# Patient Record
Sex: MALE | Race: WHITE | NOT HISPANIC OR LATINO | Employment: OTHER | URBAN - METROPOLITAN AREA
[De-identification: names, ages, dates, MRNs, and addresses within clinical notes are randomized per-mention and may not be internally consistent; named-entity substitution may affect disease eponyms.]

---

## 2017-01-29 ENCOUNTER — APPOINTMENT (OUTPATIENT)
Dept: PREADMISSION TESTING | Facility: HOSPITAL | Age: 77
End: 2017-01-29

## 2017-01-29 DIAGNOSIS — I49.3 PVC (PREMATURE VENTRICULAR CONTRACTION): ICD-10-CM

## 2017-01-29 LAB
ANION GAP SERPL CALCULATED.3IONS-SCNC: 4 MMOL/L (ref 3–11)
BASOPHILS # BLD AUTO: 0.01 10*3/MM3 (ref 0–0.2)
BASOPHILS NFR BLD AUTO: 0.1 % (ref 0–1)
BUN BLD-MCNC: 28 MG/DL (ref 9–23)
BUN/CREAT SERPL: 23.3 (ref 7–25)
CALCIUM SPEC-SCNC: 10.8 MG/DL (ref 8.7–10.4)
CHLORIDE SERPL-SCNC: 110 MMOL/L (ref 99–109)
CO2 SERPL-SCNC: 29 MMOL/L (ref 20–31)
CREAT BLD-MCNC: 1.2 MG/DL (ref 0.6–1.3)
DEPRECATED RDW RBC AUTO: 50.5 FL (ref 37–54)
EOSINOPHIL # BLD AUTO: 0.14 10*3/MM3 (ref 0.1–0.3)
EOSINOPHIL NFR BLD AUTO: 2 % (ref 0–3)
ERYTHROCYTE [DISTWIDTH] IN BLOOD BY AUTOMATED COUNT: 13.9 % (ref 11.3–14.5)
GFR SERPL CREATININE-BSD FRML MDRD: 59 ML/MIN/1.73
GLUCOSE BLD-MCNC: 102 MG/DL (ref 70–100)
HCT VFR BLD AUTO: 44.8 % (ref 38.9–50.9)
HGB BLD-MCNC: 14.6 G/DL (ref 13.1–17.5)
IMM GRANULOCYTES # BLD: 0.01 10*3/MM3 (ref 0–0.03)
IMM GRANULOCYTES NFR BLD: 0.1 % (ref 0–0.6)
INR PPP: 1.02
LYMPHOCYTES # BLD AUTO: 1.98 10*3/MM3 (ref 0.6–4.8)
LYMPHOCYTES NFR BLD AUTO: 28.7 % (ref 24–44)
MCH RBC QN AUTO: 32.3 PG (ref 27–31)
MCHC RBC AUTO-ENTMCNC: 32.6 G/DL (ref 32–36)
MCV RBC AUTO: 99.1 FL (ref 80–99)
MONOCYTES # BLD AUTO: 0.62 10*3/MM3 (ref 0–1)
MONOCYTES NFR BLD AUTO: 9 % (ref 0–12)
NEUTROPHILS # BLD AUTO: 4.13 10*3/MM3 (ref 1.5–8.3)
NEUTROPHILS NFR BLD AUTO: 60.1 % (ref 41–71)
PLATELET # BLD AUTO: 205 10*3/MM3 (ref 150–450)
PMV BLD AUTO: 11.8 FL (ref 6–12)
POTASSIUM BLD-SCNC: 5 MMOL/L (ref 3.5–5.5)
PROTHROMBIN TIME: 11.1 SECONDS (ref 9.6–11.5)
RBC # BLD AUTO: 4.52 10*6/MM3 (ref 4.2–5.76)
SODIUM BLD-SCNC: 143 MMOL/L (ref 132–146)
WBC NRBC COR # BLD: 6.89 10*3/MM3 (ref 3.5–10.8)

## 2017-01-29 PROCEDURE — 80048 BASIC METABOLIC PNL TOTAL CA: CPT | Performed by: PHYSICIAN ASSISTANT

## 2017-01-29 PROCEDURE — 36415 COLL VENOUS BLD VENIPUNCTURE: CPT

## 2017-01-29 PROCEDURE — 85610 PROTHROMBIN TIME: CPT | Performed by: PHYSICIAN ASSISTANT

## 2017-01-29 PROCEDURE — 85025 COMPLETE CBC W/AUTO DIFF WBC: CPT | Performed by: PHYSICIAN ASSISTANT

## 2017-05-15 ENCOUNTER — APPOINTMENT (OUTPATIENT)
Dept: CARDIOLOGY | Facility: HOSPITAL | Age: 77
End: 2017-05-15
Attending: INTERNAL MEDICINE

## 2017-05-25 ENCOUNTER — OFFICE VISIT (OUTPATIENT)
Dept: CARDIOLOGY | Facility: CLINIC | Age: 77
End: 2017-05-25

## 2017-05-25 VITALS — HEART RATE: 60 BPM | SYSTOLIC BLOOD PRESSURE: 120 MMHG | HEIGHT: 71 IN | DIASTOLIC BLOOD PRESSURE: 68 MMHG

## 2017-05-25 DIAGNOSIS — I49.3 PVC'S (PREMATURE VENTRICULAR CONTRACTIONS): Primary | ICD-10-CM

## 2017-05-25 DIAGNOSIS — G47.33 OSA (OBSTRUCTIVE SLEEP APNEA): ICD-10-CM

## 2017-05-25 DIAGNOSIS — I25.5 CARDIOMYOPATHY, ISCHEMIC: ICD-10-CM

## 2017-05-25 DIAGNOSIS — I50.22 CHRONIC SYSTOLIC CONGESTIVE HEART FAILURE (HCC): ICD-10-CM

## 2017-05-25 PROCEDURE — 99212 OFFICE O/P EST SF 10 MIN: CPT | Performed by: INTERNAL MEDICINE

## 2017-05-25 RX ORDER — ASPIRIN 325 MG
650 TABLET, DELAYED RELEASE (ENTERIC COATED) ORAL NIGHTLY
COMMUNITY
End: 2019-06-04 | Stop reason: HOSPADM

## 2019-06-01 ENCOUNTER — HOSPITAL ENCOUNTER (INPATIENT)
Facility: HOSPITAL | Age: 79
LOS: 3 days | Discharge: HOME | End: 2019-06-04
Attending: EMERGENCY MEDICINE | Admitting: INTERNAL MEDICINE

## 2019-06-01 ENCOUNTER — APPOINTMENT (OUTPATIENT)
Dept: GENERAL RADIOLOGY | Facility: HOSPITAL | Age: 79
End: 2019-06-01

## 2019-06-01 ENCOUNTER — APPOINTMENT (OUTPATIENT)
Dept: CT IMAGING | Facility: HOSPITAL | Age: 79
End: 2019-06-01

## 2019-06-01 ENCOUNTER — APPOINTMENT (OUTPATIENT)
Dept: MRI IMAGING | Facility: HOSPITAL | Age: 79
End: 2019-06-01

## 2019-06-01 DIAGNOSIS — R55 SYNCOPE AND COLLAPSE: ICD-10-CM

## 2019-06-01 DIAGNOSIS — K92.1 GASTROINTESTINAL HEMORRHAGE WITH MELENA: Primary | ICD-10-CM

## 2019-06-01 DIAGNOSIS — Z79.1 NSAID LONG-TERM USE: ICD-10-CM

## 2019-06-01 DIAGNOSIS — Z74.09 IMPAIRED FUNCTIONAL MOBILITY, BALANCE, GAIT, AND ENDURANCE: ICD-10-CM

## 2019-06-01 PROBLEM — R53.1 WEAKNESS: Status: ACTIVE | Noted: 2019-06-01
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
## 2019-06-01 PROBLEM — R47.1 DYSARTHRIA: Status: ACTIVE | Noted: 2019-06-01

## 2019-06-01 PROBLEM — E87.20 LACTIC ACIDOSIS: Status: ACTIVE | Noted: 2019-06-01

## 2019-06-01 PROBLEM — I63.9 ACUTE CEREBROVASCULAR ACCIDENT (CVA) OF CEREBELLUM (HCC): Status: ACTIVE | Noted: 2019-06-01

## 2019-06-01 PROBLEM — K57.90 DIVERTICULAR DISEASE: Status: ACTIVE | Noted: 2019-06-01

## 2019-06-01 LAB
ABO GROUP BLD: NORMAL
ABO GROUP BLD: NORMAL
ALBUMIN SERPL-MCNC: 4 G/DL (ref 3.5–5.2)
ALBUMIN/GLOB SERPL: 1.3 G/DL
ALP SERPL-CCNC: 52 U/L (ref 39–117)
ALT SERPL W P-5'-P-CCNC: 12 U/L (ref 1–41)
ANION GAP SERPL CALCULATED.3IONS-SCNC: 13 MMOL/L
AST SERPL-CCNC: 13 U/L (ref 1–40)
BACTERIA UR QL AUTO: NORMAL /HPF
BASOPHILS # BLD AUTO: 0.01 10*3/MM3 (ref 0–0.2)
BASOPHILS NFR BLD AUTO: 0.1 % (ref 0–1.5)
BILIRUB SERPL-MCNC: 0.5 MG/DL (ref 0.2–1.2)
BILIRUB UR QL STRIP: NEGATIVE
BLD GP AB SCN SERPL QL: NEGATIVE
BUN BLD-MCNC: 51 MG/DL (ref 8–23)
BUN/CREAT SERPL: 46.8 (ref 7–25)
CALCIUM SPEC-SCNC: 9.8 MG/DL (ref 8.6–10.5)
CHLORIDE SERPL-SCNC: 108 MMOL/L (ref 98–107)
CLARITY UR: CLEAR
CO2 SERPL-SCNC: 20 MMOL/L (ref 22–29)
COLOR UR: YELLOW
CREAT BLD-MCNC: 1.09 MG/DL (ref 0.76–1.27)
D-LACTATE SERPL-SCNC: 1.8 MMOL/L (ref 0.5–2)
D-LACTATE SERPL-SCNC: 2.5 MMOL/L (ref 0.5–2)
DEPRECATED RDW RBC AUTO: 49.6 FL (ref 37–54)
DEVELOPER EXPIRATION DATE: ABNORMAL
DEVELOPER LOT NUMBER: 7894
EOSINOPHIL # BLD AUTO: 0 10*3/MM3 (ref 0–0.4)
EOSINOPHIL NFR BLD AUTO: 0 % (ref 0.3–6.2)
ERYTHROCYTE [DISTWIDTH] IN BLOOD BY AUTOMATED COUNT: 13.9 % (ref 12.3–15.4)
EXPIRATION DATE: ABNORMAL
FECAL OCCULT BLOOD SCREEN, POC: POSITIVE
GFR SERPL CREATININE-BSD FRML MDRD: 65 ML/MIN/1.73
GLOBULIN UR ELPH-MCNC: 3 GM/DL
GLUCOSE BLD-MCNC: 136 MG/DL (ref 65–99)
GLUCOSE BLDC GLUCOMTR-MCNC: 111 MG/DL (ref 70–130)
GLUCOSE BLDC GLUCOMTR-MCNC: 116 MG/DL (ref 70–130)
GLUCOSE UR STRIP-MCNC: NEGATIVE MG/DL
HCT VFR BLD AUTO: 26.7 % (ref 37.5–51)
HCT VFR BLD AUTO: 30.2 % (ref 37.5–51)
HGB BLD-MCNC: 8.6 G/DL (ref 13–17.7)
HGB BLD-MCNC: 9.8 G/DL (ref 13–17.7)
HGB UR QL STRIP.AUTO: NEGATIVE
HOLD SPECIMEN: NORMAL
HYALINE CASTS UR QL AUTO: NORMAL /LPF
IMM GRANULOCYTES # BLD AUTO: 0.04 10*3/MM3 (ref 0–0.05)
IMM GRANULOCYTES NFR BLD AUTO: 0.4 % (ref 0–0.5)
KETONES UR QL STRIP: NEGATIVE
LEUKOCYTE ESTERASE UR QL STRIP.AUTO: ABNORMAL
LYMPHOCYTES # BLD AUTO: 1.02 10*3/MM3 (ref 0.7–3.1)
LYMPHOCYTES NFR BLD AUTO: 10.3 % (ref 19.6–45.3)
Lab: ABNORMAL
MAGNESIUM SERPL-MCNC: 2.5 MG/DL (ref 1.6–2.4)
MCH RBC QN AUTO: 31.9 PG (ref 26.6–33)
MCHC RBC AUTO-ENTMCNC: 32.5 G/DL (ref 31.5–35.7)
MCV RBC AUTO: 98.4 FL (ref 79–97)
MONOCYTES # BLD AUTO: 0.57 10*3/MM3 (ref 0.1–0.9)
MONOCYTES NFR BLD AUTO: 5.8 % (ref 5–12)
NEGATIVE CONTROL: NEGATIVE
NEUTROPHILS # BLD AUTO: 8.29 10*3/MM3 (ref 1.7–7)
NEUTROPHILS NFR BLD AUTO: 83.8 % (ref 42.7–76)
NITRITE UR QL STRIP: NEGATIVE
PH UR STRIP.AUTO: <=5 [PH] (ref 5–8)
PLATELET # BLD AUTO: 236 10*3/MM3 (ref 140–450)
PMV BLD AUTO: 10.7 FL (ref 6–12)
POSITIVE CONTROL: POSITIVE
POTASSIUM BLD-SCNC: 4.3 MMOL/L (ref 3.5–5.2)
PROT SERPL-MCNC: 7 G/DL (ref 6–8.5)
PROT UR QL STRIP: NEGATIVE
RBC # BLD AUTO: 3.07 10*6/MM3 (ref 4.14–5.8)
RBC # UR: NORMAL /HPF
REF LAB TEST METHOD: NORMAL
RH BLD: POSITIVE
RH BLD: POSITIVE
SODIUM BLD-SCNC: 141 MMOL/L (ref 136–145)
SP GR UR STRIP: 1.02 (ref 1–1.03)
SQUAMOUS #/AREA URNS HPF: NORMAL /HPF
T&S EXPIRATION DATE: NORMAL
TROPONIN T SERPL-MCNC: <0.01 NG/ML (ref 0–0.03)
UROBILINOGEN UR QL STRIP: ABNORMAL
WBC NRBC COR # BLD: 9.89 10*3/MM3 (ref 3.4–10.8)
WBC UR QL AUTO: NORMAL /HPF
WHOLE BLOOD HOLD SPECIMEN: NORMAL
WHOLE BLOOD HOLD SPECIMEN: NORMAL

## 2019-06-01 PROCEDURE — 80053 COMPREHEN METABOLIC PANEL: CPT | Performed by: EMERGENCY MEDICINE

## 2019-06-01 PROCEDURE — 86923 COMPATIBILITY TEST ELECTRIC: CPT

## 2019-06-01 PROCEDURE — 85025 COMPLETE CBC W/AUTO DIFF WBC: CPT | Performed by: EMERGENCY MEDICINE

## 2019-06-01 PROCEDURE — 86900 BLOOD TYPING SEROLOGIC ABO: CPT

## 2019-06-01 PROCEDURE — 93005 ELECTROCARDIOGRAM TRACING: CPT | Performed by: EMERGENCY MEDICINE

## 2019-06-01 PROCEDURE — 83735 ASSAY OF MAGNESIUM: CPT | Performed by: EMERGENCY MEDICINE

## 2019-06-01 PROCEDURE — 99221 1ST HOSP IP/OBS SF/LOW 40: CPT | Performed by: HOSPITALIST

## 2019-06-01 PROCEDURE — 85014 HEMATOCRIT: CPT | Performed by: HOSPITALIST

## 2019-06-01 PROCEDURE — 85018 HEMOGLOBIN: CPT | Performed by: HOSPITALIST

## 2019-06-01 PROCEDURE — 74176 CT ABD & PELVIS W/O CONTRAST: CPT

## 2019-06-01 PROCEDURE — 36430 TRANSFUSION BLD/BLD COMPNT: CPT

## 2019-06-01 PROCEDURE — 99285 EMERGENCY DEPT VISIT HI MDM: CPT

## 2019-06-01 PROCEDURE — 83605 ASSAY OF LACTIC ACID: CPT | Performed by: EMERGENCY MEDICINE

## 2019-06-01 PROCEDURE — 86900 BLOOD TYPING SEROLOGIC ABO: CPT | Performed by: EMERGENCY MEDICINE

## 2019-06-01 PROCEDURE — 84484 ASSAY OF TROPONIN QUANT: CPT | Performed by: EMERGENCY MEDICINE

## 2019-06-01 PROCEDURE — 86901 BLOOD TYPING SEROLOGIC RH(D): CPT | Performed by: EMERGENCY MEDICINE

## 2019-06-01 PROCEDURE — 70551 MRI BRAIN STEM W/O DYE: CPT

## 2019-06-01 PROCEDURE — 99222 1ST HOSP IP/OBS MODERATE 55: CPT | Performed by: INTERNAL MEDICINE

## 2019-06-01 PROCEDURE — 81001 URINALYSIS AUTO W/SCOPE: CPT | Performed by: EMERGENCY MEDICINE

## 2019-06-01 PROCEDURE — 82270 OCCULT BLOOD FECES: CPT | Performed by: EMERGENCY MEDICINE

## 2019-06-01 PROCEDURE — P9016 RBC LEUKOCYTES REDUCED: HCPCS

## 2019-06-01 PROCEDURE — 86901 BLOOD TYPING SEROLOGIC RH(D): CPT

## 2019-06-01 PROCEDURE — 70450 CT HEAD/BRAIN W/O DYE: CPT

## 2019-06-01 PROCEDURE — 86850 RBC ANTIBODY SCREEN: CPT | Performed by: EMERGENCY MEDICINE

## 2019-06-01 PROCEDURE — 82962 GLUCOSE BLOOD TEST: CPT

## 2019-06-01 PROCEDURE — 71045 X-RAY EXAM CHEST 1 VIEW: CPT

## 2019-06-01 RX ORDER — SODIUM CHLORIDE 0.9 % (FLUSH) 0.9 %
3 SYRINGE (ML) INJECTION EVERY 12 HOURS SCHEDULED
Status: DISCONTINUED | OUTPATIENT
Start: 2019-06-01 | End: 2019-06-04 | Stop reason: HOSPADM

## 2019-06-01 RX ORDER — ASPIRIN 81 MG/1
81 TABLET ORAL DAILY
Status: DISCONTINUED | OUTPATIENT
Start: 2019-06-01 | End: 2019-06-01

## 2019-06-01 RX ORDER — SODIUM CHLORIDE 0.9 % (FLUSH) 0.9 %
10 SYRINGE (ML) INJECTION AS NEEDED
Status: DISCONTINUED | OUTPATIENT
Start: 2019-06-01 | End: 2019-06-04 | Stop reason: HOSPADM

## 2019-06-01 RX ORDER — ATORVASTATIN CALCIUM 40 MG/1
40 TABLET, FILM COATED ORAL NIGHTLY
Status: DISCONTINUED | OUTPATIENT
Start: 2019-06-01 | End: 2019-06-04 | Stop reason: HOSPADM

## 2019-06-01 RX ORDER — ANTIOX #8/OM3/DHA/EPA/LUT/ZEAX 250-2.5 MG
1 CAPSULE ORAL 2 TIMES DAILY
COMMUNITY

## 2019-06-01 RX ORDER — PANTOPRAZOLE SODIUM 40 MG/10ML
40 INJECTION, POWDER, LYOPHILIZED, FOR SOLUTION INTRAVENOUS
Status: DISCONTINUED | OUTPATIENT
Start: 2019-06-01 | End: 2019-06-04 | Stop reason: HOSPADM

## 2019-06-01 RX ORDER — SPIRONOLACTONE 25 MG/1
25 TABLET ORAL EVERY MORNING
Status: DISCONTINUED | OUTPATIENT
Start: 2019-06-02 | End: 2019-06-04 | Stop reason: HOSPADM

## 2019-06-01 RX ORDER — HYDROCODONE BITARTRATE AND ACETAMINOPHEN 7.5; 325 MG/1; MG/1
1 TABLET ORAL EVERY 6 HOURS PRN
Status: DISCONTINUED | OUTPATIENT
Start: 2019-06-01 | End: 2019-06-04 | Stop reason: HOSPADM

## 2019-06-01 RX ORDER — PANTOPRAZOLE SODIUM 40 MG/1
40 TABLET, DELAYED RELEASE ORAL
Status: DISCONTINUED | OUTPATIENT
Start: 2019-06-01 | End: 2019-06-01

## 2019-06-01 RX ORDER — METOPROLOL SUCCINATE 50 MG/1
50 TABLET, EXTENDED RELEASE ORAL EVERY MORNING
Status: DISCONTINUED | OUTPATIENT
Start: 2019-06-02 | End: 2019-06-04 | Stop reason: HOSPADM

## 2019-06-01 RX ORDER — AMLODIPINE BESYLATE 5 MG/1
5 TABLET ORAL EVERY MORNING
Status: DISCONTINUED | OUTPATIENT
Start: 2019-06-02 | End: 2019-06-04 | Stop reason: HOSPADM

## 2019-06-01 RX ORDER — SODIUM CHLORIDE 9 MG/ML
75 INJECTION, SOLUTION INTRAVENOUS CONTINUOUS
Status: DISCONTINUED | OUTPATIENT
Start: 2019-06-01 | End: 2019-06-02

## 2019-06-01 RX ORDER — SODIUM CHLORIDE 0.9 % (FLUSH) 0.9 %
3-10 SYRINGE (ML) INJECTION AS NEEDED
Status: DISCONTINUED | OUTPATIENT
Start: 2019-06-01 | End: 2019-06-04 | Stop reason: HOSPADM

## 2019-06-01 RX ORDER — HYDROCODONE BITARTRATE AND ACETAMINOPHEN 7.5; 325 MG/1; MG/1
1 TABLET ORAL EVERY 6 HOURS PRN
COMMUNITY

## 2019-06-01 RX ORDER — SODIUM CHLORIDE 9 MG/ML
125 INJECTION, SOLUTION INTRAVENOUS CONTINUOUS
Status: DISCONTINUED | OUTPATIENT
Start: 2019-06-01 | End: 2019-06-01

## 2019-06-01 RX ORDER — PANTOPRAZOLE SODIUM 40 MG/10ML
40 INJECTION, POWDER, LYOPHILIZED, FOR SOLUTION INTRAVENOUS ONCE
Status: COMPLETED | OUTPATIENT
Start: 2019-06-01 | End: 2019-06-01

## 2019-06-01 RX ADMIN — HYDROCODONE BITARTRATE AND ACETAMINOPHEN 1 TABLET: 7.5; 325 TABLET ORAL at 20:30

## 2019-06-01 RX ADMIN — ATORVASTATIN CALCIUM 40 MG: 40 TABLET, FILM COATED ORAL at 20:28

## 2019-06-01 RX ADMIN — PANTOPRAZOLE SODIUM 40 MG: 40 INJECTION, POWDER, FOR SOLUTION INTRAVENOUS at 20:28

## 2019-06-01 RX ADMIN — SODIUM CHLORIDE 75 ML/HR: 9 INJECTION, SOLUTION INTRAVENOUS at 20:31

## 2019-06-01 RX ADMIN — HYDROCODONE BITARTRATE AND ACETAMINOPHEN 1 TABLET: 7.5; 325 TABLET ORAL at 14:35

## 2019-06-01 RX ADMIN — SODIUM CHLORIDE 125 ML/HR: 9 INJECTION, SOLUTION INTRAVENOUS at 13:40

## 2019-06-01 RX ADMIN — PANTOPRAZOLE SODIUM 40 MG: 40 INJECTION, POWDER, FOR SOLUTION INTRAVENOUS at 11:38

## 2019-06-01 RX ADMIN — SODIUM CHLORIDE, PRESERVATIVE FREE 3 ML: 5 INJECTION INTRAVENOUS at 20:31

## 2019-06-01 NOTE — PLAN OF CARE
Problem: Fall Risk (Adult)  Intervention: Monitor/Assist with Self Care   19 152   Activity   Activity Assistance Provided assistance, 1 person   Daily Care Interventions   Self-Care Promotion BADL personal objects within reach     Intervention: Reduce Risk/Promote Restraint Free Environment   19   Safety Management   Environmental Safety Modification clutter free environment maintained   Safety Promotion/Fall Prevention nonskid shoes/slippers when out of bed   Prevent  Drop/Fall   Safety/Security Measures family to remain at bedside     Intervention: Review Medications/Identify Contributors to Fall Risk   19   Safety Management   Medication Review/Management medications reviewed       Goal: Identify Related Risk Factors and Signs and Symptoms  Outcome: Ongoing (interventions implemented as appropriate)   19 152   Fall Risk (Adult)   Related Risk Factors (Fall Risk) age-related changes;fatigue/slow reaction;fear of falling;gait/mobility problems;history of falls   Signs and Symptoms (Fall Risk) presence of risk factors     Goal: Absence of Fall  Outcome: Ongoing (interventions implemented as appropriate)   19 152   Fall Risk (Adult)   Absence of Fall making progress toward outcome       Problem: Patient Care Overview  Goal: Plan of Care Review  Outcome: Ongoing (interventions implemented as appropriate)   19 152   Coping/Psychosocial   Plan of Care Reviewed With patient;spouse   OTHER   Outcome Summary No falls and patient will verbalize importance of using call bell for assistance.

## 2019-06-01 NOTE — CONSULTS
[facility] [place] [facility]enterology Consult    Referring Provider: No ref. provider found    PCP: Kumar Rodriguez MD    Reason for Consultation: Symptomatic anemia    Chief complaint: Melena    History of present illness:    Salbador Apple is a 78 y.o. male who is admitted with a several day history of melena.  There is associated epigastric discomfort and orthostatic lightheadedness.  Denies nausea or vomiting.  Denies dyspnea or angina.  Symptoms not improved with aspirin.  The patient takes excessive aspirin.    Allergies:  Patient has no known allergies.    Scheduled Meds:    [START ON 6/2/2019] amLODIPine 5 mg Oral QAM   atorvastatin 40 mg Oral Nightly   [START ON 6/2/2019] metoprolol succinate XL 50 mg Oral QAM   pantoprazole 40 mg Oral BID AC   [START ON 6/2/2019] sacubitril-valsartan 1 tablet Oral QAM   sodium chloride 3 mL Intravenous Q12H   [START ON 6/2/2019] spironolactone 25 mg Oral QAM        Infusions:    sodium chloride 125 mL/hr Last Rate: 125 mL/hr (06/01/19 1340)       PRN Meds:  HYDROcodone-acetaminophen  •  sodium chloride  •  sodium chloride    Home Meds:  Medications Prior to Admission   Medication Sig Dispense Refill Last Dose   • amLODIPine (NORVASC) 5 MG tablet Take 5 mg by mouth Every Morning.   5/31/2019 at Unknown time   • aspirin  MG tablet Take 650 mg by mouth Every Night.   5/31/2019 at Unknown time   • HYDROcodone-acetaminophen (NORCO) 7.5-325 MG per tablet Take 1 tablet by mouth Every 6 (Six) Hours As Needed for Moderate Pain .   5/31/2019 at Unknown time   • metoprolol succinate XL (TOPROL-XL) 50 MG 24 hr tablet Take 50 mg by mouth Every Morning.   6/1/2019 at Unknown time   • multivitamins-minerals (PRESERVISION AREDS 2) capsule capsule Take 1 capsule by mouth 2 (Two) Times a Day.   5/31/2019 at Unknown time   • sacubitril-valsartan (ENTRESTO) 24-26 MG tablet Take 1 tablet by mouth 2 (Two) Times a Day.   5/31/2019 at Unknown time   • spironolactone (ALDACTONE) 25 MG tablet  Take 25 mg by mouth Every Morning.   6/1/2019 at Unknown time       ROS: Review of Systems   Constitutional: Positive for activity change and fatigue. Negative for appetite change, chills, fever and unexpected weight change.   HENT: Negative for mouth sores, nosebleeds and voice change.         Hard of hearing.   Eyes: Negative.    Respiratory: Negative.  Negative for cough, chest tightness, shortness of breath, wheezing and stridor.    Cardiovascular: Negative.  Negative for chest pain, palpitations and leg swelling.   Gastrointestinal: Positive for abdominal pain and blood in stool. Negative for abdominal distention, constipation, diarrhea, nausea and vomiting.   Endocrine: Negative.    Genitourinary: Negative.  Negative for difficulty urinating and dysuria.   Musculoskeletal: Positive for arthralgias and back pain.   Skin: Negative.  Negative for color change, pallor and rash.   Allergic/Immunologic: Negative.    Neurological: Positive for dizziness, speech difficulty, weakness and light-headedness. Negative for tremors, seizures, syncope and headaches.   Hematological: Negative.  Negative for adenopathy. Does not bruise/bleed easily.   Psychiatric/Behavioral: Negative.  Negative for agitation and behavioral problems.       PAST MED HX:  Past Medical History:   Diagnosis Date   • Arthritis    • Back pain    • CHF (congestive heart failure) (CMS/HCC)    • Hearing difficulty of both ears    • Hypertension    • Neck pain    • PVC (premature ventricular contraction)        PAST SURG HX:  Past Surgical History:   Procedure Laterality Date   • CARDIAC ABLATION  01/30/2017    PVC ABLATION PER DR. MCINTOSH   • CARDIAC CATHETERIZATION  09/15/2016    PER DR. WAKEFIELD   • CARDIAC CATHETERIZATION N/A 9/15/2016    Procedure: Left Heart Cath;  Surgeon: Lonnie Wakefield MD;  Location: [facility] INVASIVE LOCATION;  Service:    • CARDIAC ELECTROPHYSIOLOGY PROCEDURE N/A 1/30/2017    Procedure: EPS+/- ablation for PVC's;   "Surgeon: Pj Peterson MD;  Location: [facility] INVASIVE LOCATION;  Service:    • PROSTATE SURGERY         FAM HX:  History reviewed. No pertinent family history.    SOC HX:  Social History     Socioeconomic History   • Marital status:      Spouse name: Not on file   • Number of children: Not on file   • Years of education: Not on file   • Highest education level: Not on file   Tobacco Use   • Smoking status: Never Smoker   • Smokeless tobacco: Never Used   Substance and Sexual Activity   • Alcohol use: No   • Drug use: No   • Sexual activity: Defer       PHYSICAL EXAM  /70 (BP Location: Right arm, Patient Position: Sitting)   Pulse 70   Temp 98.2 °F (36.8 °C) (Oral)   Resp 18   Ht 182.9 cm (72\")   Wt 97 kg (213 lb 12.8 oz)   SpO2 97%   BMI 29.00 kg/m²   Wt Readings from Last 3 Encounters:   06/01/19 97 kg (213 lb 12.8 oz)   01/30/17 96 kg (211 lb 10.3 oz)   09/15/16 94.2 kg (207 lb 10.8 oz)   ,body mass index is 29 kg/m².  Physical Exam   Constitutional: He is oriented to person, place, and time. He appears well-developed and well-nourished. No distress.   HENT:   Head: Normocephalic.   Mouth/Throat: No oropharyngeal exudate.   Eyes: Conjunctivae are normal. No scleral icterus.   Neck: Normal range of motion.   Cardiovascular: Normal rate.   Pulmonary/Chest: Effort normal and breath sounds normal. No stridor. No respiratory distress. He has no wheezes. He has no rales.   Abdominal: Soft. Bowel sounds are normal. He exhibits no distension and no mass. There is no tenderness. There is no guarding.   Genitourinary:   Genitourinary Comments: Deferred   Musculoskeletal: Normal range of motion. He exhibits no edema.   Neurological: He is alert and oriented to person, place, and time.   Skin: Skin is warm and dry. Capillary refill takes less than 2 seconds. No rash noted. No erythema. No pallor.   Psychiatric: He has a normal mood and affect. His behavior is normal.   Nursing note and vitals " reviewed.    Results Review:   I reviewed the patient's new clinical results.    Lab Results   Component Value Date    WBC 9.89 06/01/2019    HGB 9.8 (L) 06/01/2019    HGB 14.6 01/29/2017    HGB 15.6 09/15/2016    HCT 30.2 (L) 06/01/2019    MCV 98.4 (H) 06/01/2019     06/01/2019       Lab Results   Component Value Date    INR 1.02 01/29/2017       Lab Results   Component Value Date    GLUCOSE 136 (H) 06/01/2019    BUN 51 (H) 06/01/2019    CREATININE 1.09 06/01/2019    EGFRIFNONA 65 06/01/2019    BCR 46.8 (H) 06/01/2019    CO2 20.0 (L) 06/01/2019    CALCIUM 9.8 06/01/2019    ALBUMIN 4.00 06/01/2019    ALKPHOS 52 06/01/2019    BILITOT 0.5 06/01/2019    ALT 12 06/01/2019    AST 13 06/01/2019       ASSESSMENTS/PLANS    Epigastric pain.  At risk for peptic ulcer disease.  Acute blood loss anemia.  Melena, improving.  Only one bowel movement since admission.  Chronic NSAID use (aspirin).  Lactic acidosis.    >> Agree with twice daily PPI.  Will change to IV.  >> Recommend echocardiogram given history of ischemic cardiomyopathy, and orthostatic symptoms out of proportion to degree of anemia.  >> Plan EGD on Monday, 6/3/2019, sooner if develops overt bleeding.     I discussed the patients findings and my recommendations with patient, family, and Dr. Denis.    Mark I. Brunner, MD  06/01/19  5:20 PM

## 2019-06-01 NOTE — ED PROVIDER NOTES
Subjective   Salbador Apple is a 78 y.o.male who presents to the emergency department with complaints of generalized weakness, dizziness, abdominal pain, nausea, and melenic stools. His symptoms started three days ago and have not been getting any better. He fell and hit his head last night which prompted arrival here for evaluation. He denies head, neck, or back pain from the fall. He did not experience loss of consciousness. He is not on an anticoagulant, but does take Anacin daily. His last colonoscopy was ten years ago. He does not feel that his dyspnea is any worse than baseline. There are no other acute complaints at this time.        History provided by:  Patient and spouse  GI Bleeding   Quality:  Melena  Severity:  Moderate  Onset quality:  Sudden  Duration:  3 days  Timing:  Constant  Progression:  Unchanged  Chronicity:  New  Relieved by:  None tried  Worsened by:  Nothing  Ineffective treatments:  None tried  Associated symptoms: abdominal pain and nausea    Associated symptoms: no headaches and no shortness of breath        Review of Systems   Respiratory: Negative for shortness of breath.    Gastrointestinal: Positive for abdominal pain, blood in stool and nausea.   Musculoskeletal: Negative for back pain and neck pain.   Neurological: Positive for dizziness and weakness. Negative for syncope and headaches.   All other systems reviewed and are negative.      Past Medical History:   Diagnosis Date   • Arthritis    • Back pain    • CHF (congestive heart failure) (CMS/HCC)    • Hearing difficulty of both ears    • Hypertension    • Neck pain    • PVC (premature ventricular contraction)        No Known Allergies    Past Surgical History:   Procedure Laterality Date   • CARDIAC ABLATION  01/30/2017    PVC ABLATION PER DR. MCINTOSH   • CARDIAC CATHETERIZATION  09/15/2016    PER DR. WAKEFIELD   • CARDIAC CATHETERIZATION N/A 9/15/2016    Procedure: Left Heart Cath;  Surgeon: Lonnie Wakefield MD;   Location: [facility] CATH INVASIVE LOCATION;  Service:    • CARDIAC ELECTROPHYSIOLOGY PROCEDURE N/A 1/30/2017    Procedure: EPS+/- ablation for PVC's;  Surgeon: Pj Peterson MD;  Location: [facility] EP INVASIVE LOCATION;  Service:    • PROSTATE SURGERY         History reviewed. No pertinent family history.    Social History     Socioeconomic History   • Marital status:      Spouse name: Not on file   • Number of children: Not on file   • Years of education: Not on file   • Highest education level: Not on file   Tobacco Use   • Smoking status: Never Smoker   • Smokeless tobacco: Never Used   Substance and Sexual Activity   • Alcohol use: No   • Drug use: No   • Sexual activity: Defer         Objective   Physical Exam   Constitutional: He is oriented to person, place, and time. He appears well-developed and well-nourished. No distress.   HENT:   Head: Normocephalic. Head is with abrasion.   Mouth/Throat: Oropharynx is clear and moist.   Small abrasion on the occiput. No crepitus or tenderness.   Eyes: No scleral icterus.   Conjunctivae are pale.   Neck: Normal range of motion and full passive range of motion without pain. Neck supple. No spinous process tenderness present.   No tenderness over the cervical spine. No step-off or crepitus.   Cardiovascular: Normal rate, regular rhythm and normal heart sounds.   No murmur heard.  Pulmonary/Chest: Effort normal and breath sounds normal. No respiratory distress.   Abdominal: Soft. Bowel sounds are normal. There is tenderness in the left lower quadrant. There is no rigidity, no rebound and no guarding.   Mild tenderness to the left lower quadrant with no guarding, rebound, or rigidity.   Genitourinary: Rectal exam shows guaiac positive stool. Rectal exam shows no mass and anal tone normal.   Genitourinary Comments: Good sphincter tone. No mass. Stool is black and heme positive.   Musculoskeletal: He exhibits no edema.   Neurological: He is alert and oriented to  person, place, and time.   Skin: Skin is warm and dry. No rash noted. No erythema. There is pallor.   Psychiatric: He has a normal mood and affect. His behavior is normal.   Nursing note and vitals reviewed.      Procedures         ED Course  ED Course as of Jun 01 2127   Sat Jun 01, 2019   1330 Discussed the findings with the patient and his wife.  We discussed the need for admission due to syncopal episode and the copious amounts of black tarry stools.  I suspect the aspirin is the culprit causing some type of gastric ulcer.  He has been given a first dose of Protonix Dr. THOMSON has agreed to admit the patient to telemetry.  [JOHANN]      ED Course User Index  [JOHANN] Ayad Reis PA     Recent Results (from the past 24 hour(s))   Comprehensive Metabolic Panel    Collection Time: 06/01/19 11:32 AM   Result Value Ref Range    Glucose 136 (H) 65 - 99 mg/dL    BUN 51 (H) 8 - 23 mg/dL    Creatinine 1.09 0.76 - 1.27 mg/dL    Sodium 141 136 - 145 mmol/L    Potassium 4.3 3.5 - 5.2 mmol/L    Chloride 108 (H) 98 - 107 mmol/L    CO2 20.0 (L) 22.0 - 29.0 mmol/L    Calcium 9.8 8.6 - 10.5 mg/dL    Total Protein 7.0 6.0 - 8.5 g/dL    Albumin 4.00 3.50 - 5.20 g/dL    ALT (SGPT) 12 1 - 41 U/L    AST (SGOT) 13 1 - 40 U/L    Alkaline Phosphatase 52 39 - 117 U/L    Total Bilirubin 0.5 0.2 - 1.2 mg/dL    eGFR Non African Amer 65 >60 mL/min/1.73    Globulin 3.0 gm/dL    A/G Ratio 1.3 g/dL    BUN/Creatinine Ratio 46.8 (H) 7.0 - 25.0    Anion Gap 13.0 mmol/L   Troponin    Collection Time: 06/01/19 11:32 AM   Result Value Ref Range    Troponin T <0.010 0.000 - 0.030 ng/mL   Magnesium    Collection Time: 06/01/19 11:32 AM   Result Value Ref Range    Magnesium 2.5 (H) 1.6 - 2.4 mg/dL   Light Blue Top    Collection Time: 06/01/19 11:32 AM   Result Value Ref Range    Extra Tube hold for add-on    Green Top (Gel)    Collection Time: 06/01/19 11:32 AM   Result Value Ref Range    Extra Tube Hold for add-ons.    Lavender Top    Collection Time:  06/01/19 11:32 AM   Result Value Ref Range    Extra Tube hold for add-on    Gold Top - SST    Collection Time: 06/01/19 11:32 AM   Result Value Ref Range    Extra Tube Hold for add-ons.    CBC Auto Differential    Collection Time: 06/01/19 11:32 AM   Result Value Ref Range    WBC 9.89 3.40 - 10.80 10*3/mm3    RBC 3.07 (L) 4.14 - 5.80 10*6/mm3    Hemoglobin 9.8 (L) 13.0 - 17.7 g/dL    Hematocrit 30.2 (L) 37.5 - 51.0 %    MCV 98.4 (H) 79.0 - 97.0 fL    MCH 31.9 26.6 - 33.0 pg    MCHC 32.5 31.5 - 35.7 g/dL    RDW 13.9 12.3 - 15.4 %    RDW-SD 49.6 37.0 - 54.0 fl    MPV 10.7 6.0 - 12.0 fL    Platelets 236 140 - 450 10*3/mm3    Neutrophil % 83.8 (H) 42.7 - 76.0 %    Lymphocyte % 10.3 (L) 19.6 - 45.3 %    Monocyte % 5.8 5.0 - 12.0 %    Eosinophil % 0.0 (L) 0.3 - 6.2 %    Basophil % 0.1 0.0 - 1.5 %    Immature Grans % 0.4 0.0 - 0.5 %    Neutrophils, Absolute 8.29 (H) 1.70 - 7.00 10*3/mm3    Lymphocytes, Absolute 1.02 0.70 - 3.10 10*3/mm3    Monocytes, Absolute 0.57 0.10 - 0.90 10*3/mm3    Eosinophils, Absolute 0.00 0.00 - 0.40 10*3/mm3    Basophils, Absolute 0.01 0.00 - 0.20 10*3/mm3    Immature Grans, Absolute 0.04 0.00 - 0.05 10*3/mm3   Type & Screen    Collection Time: 06/01/19 11:32 AM   Result Value Ref Range    ABO Type A     RH type Positive     Antibody Screen Negative     T&S Expiration Date 6/4/2019 11:59:59 PM    Lactic Acid, Plasma    Collection Time: 06/01/19 11:32 AM   Result Value Ref Range    Lactate 2.5 (C) 0.5 - 2.0 mmol/L   Lactic Acid, Reflex Timer (This will reflex a repeat order 3-3:15 hours after ordered.)    Collection Time: 06/01/19 11:32 AM   Result Value Ref Range    Extra Tube Hold for add-ons.    POCT Occult Blood, stool    Collection Time: 06/01/19 11:34 AM   Result Value Ref Range    Fecal Occult Blood Positive (A) Negative    Lot Number [id number]     Expiration Date 10/2,022     DEVELOPER LOT NUMBER [id number]     DEVELOPER EXPIRATION DATE 10/2,022     Positive Control Positive Positive     Negative Control Negative Negative   ABO RH Specimen Verification    Collection Time: 06/01/19  1:33 PM   Result Value Ref Range    ABO Type A     RH type Positive    Urinalysis With Microscopic If Indicated (No Culture) - Urine, Clean Catch    Collection Time: 06/01/19  1:40 PM   Result Value Ref Range    Color, UA Yellow Yellow, Straw    Appearance, UA Clear Clear    pH, UA <=5.0 5.0 - 8.0    Specific Gravity, UA 1.024 1.001 - 1.030    Glucose, UA Negative Negative    Ketones, UA Negative Negative    Bilirubin, UA Negative Negative    Blood, UA Negative Negative    Protein, UA Negative Negative    Leuk Esterase, UA Trace (A) Negative    Nitrite, UA Negative Negative    Urobilinogen, UA 0.2 E.U./dL 0.2 - 1.0 E.U./dL   Urinalysis, Microscopic Only - Urine, Clean Catch    Collection Time: 06/01/19  1:40 PM   Result Value Ref Range    RBC, UA 0-2 None Seen, 0-2 /HPF    WBC, UA None Seen None Seen, 0-2 /HPF    Bacteria, UA None Seen None Seen, Trace /HPF    Squamous Epithelial Cells, UA None Seen None Seen, 0-2 /HPF    Hyaline Casts, UA None Seen 0 - 6 /LPF    Methodology Automated Microscopy    Hemoglobin & Hematocrit, Blood    Collection Time: 06/01/19  5:49 PM   Result Value Ref Range    Hemoglobin 8.6 (L) 13.0 - 17.7 g/dL    Hematocrit 26.7 (L) 37.5 - 51.0 %   Lactic Acid, Reflex    Collection Time: 06/01/19  5:49 PM   Result Value Ref Range    Lactate 1.8 0.5 - 2.0 mmol/L   POC Glucose Once    Collection Time: 06/01/19  5:50 PM   Result Value Ref Range    Glucose 116 70 - 130 mg/dL   Prepare RBC, 1 Units    Collection Time: 06/01/19  9:11 PM   Result Value Ref Range    Product Code A6075W57     Unit Number [id number]     UNIT  ABO A     UNIT  RH POS     Dispense Status IS     Blood Type APOS     Blood Expiration Date 609496415035     Blood Type Barcode 6200      Note: In addition to lab results from this visit, the labs listed above may include labs taken at another facility or during a different encounter  "within the last 24 hours. Please correlate lab times with ED admission and discharge times for further clarification of the services performed during this visit.    MRI Brain Without Contrast   Preliminary Result   1. Chronic central white matter changes and small old central right   thalamic lacunar infarct. Small old right cerebellar infarct. No   evidence of acute intracranial disease.               CT Head Without Contrast   Preliminary Result   Small old right thalamic infarct. No evidence of acute   trauma or other acute disease.       DICTATED:   06/01/2019   EDITED/ls :   06/01/2019               CT Abdomen Pelvis Without Contrast   Preliminary Result   1. Moderate left colon diverticulosis but no CT scan evidence to suggest   acute diverticulitis.   2. Very small nonobstructing bilateral renal calculi noted.   3. No evidence of acute intra-abdominal or intrapelvic disease is seen   elsewhere.       DICTATED:   06/01/2019   EDITED/ls :   06/01/2019               XR Chest 1 View   Preliminary Result   Mild cardiomegaly. No evidence of active chest disease.       DICTATED:   06/01/2019   EDITED/ls :   06/01/2019                 Vitals:    06/01/19 1400 06/01/19 1435 06/01/19 1746 06/01/19 2058   BP: 127/70  150/71 138/79   BP Location: Right arm  Left arm    Patient Position: Sitting  Sitting    Pulse: 70  70 69   Resp: 18  18 18   Temp: 98.2 °F (36.8 °C)   98.3 °F (36.8 °C)   TempSrc: Oral  Oral Oral   SpO2:    96%   Weight:  97 kg (213 lb 12.8 oz)     Height:  182.9 cm (72\")       Medications   sodium chloride 0.9 % flush 10 mL (not administered)   amLODIPine (NORVASC) tablet 5 mg (not administered)   HYDROcodone-acetaminophen (NORCO) 7.5-325 MG per tablet 1 tablet (1 tablet Oral Given 6/1/19 2030)   metoprolol succinate XL (TOPROL-XL) 24 hr tablet 50 mg (not administered)   sacubitril-valsartan (ENTRESTO) 24-26 MG tablet 1 tablet (not administered)   spironolactone (ALDACTONE) tablet 25 mg (not " administered)   sodium chloride 0.9 % flush 3 mL (3 mL Intravenous Given 6/1/19 2031)   sodium chloride 0.9 % flush 3-10 mL (not administered)   atorvastatin (LIPITOR) tablet 40 mg (40 mg Oral Given 6/1/19 2028)   pantoprazole (PROTONIX) injection 40 mg (40 mg Intravenous Given 6/1/19 2028)   sodium chloride 0.9 % infusion (75 mL/hr Intravenous New Bag 6/1/19 2031)   pantoprazole (PROTONIX) injection 40 mg (40 mg Intravenous Given 6/1/19 1138)     ECG/EMG Results (last 24 hours)     Procedure Component Value Units Date/Time    ECG 12 Lead [[id number]] Collected:  06/01/19 1118     Updated:  06/01/19 1116        ECG 12 Lead                            MDM  Number of Diagnoses or Management Options  Gastrointestinal hemorrhage with melena: new and requires workup  Syncope and collapse: new and requires workup     Amount and/or Complexity of Data Reviewed  Clinical lab tests: reviewed and ordered  Tests in the radiology section of CPT®: reviewed and ordered  Tests in the medicine section of CPT®: reviewed and ordered  Discuss the patient with other providers: yes    Patient Progress  Patient progress: stable      Final diagnoses:   Gastrointestinal hemorrhage with melena   Syncope and collapse   NSAID long-term use       Documentation assistance provided by catalina oMlina.  Information recorded by the catalina was done at my direction and has been verified and validated by me.     Kristina Molina  06/01/19 1139       Ayad Reis PA  06/01/19 1038

## 2019-06-01 NOTE — H&P
"    [facility]  HISTORY AND PHYSICAL    Patient Name: Salbador Apple  : 1940  MRN: [id number]  Primary Care Physician: Kumar Rodriguez MD  Date of admission: 2019      Subjective   Subjective     Chief Complaint:  Melena/fall    HPI:  Salbador Apple is a 78 y.o. male with past medical history significant for essential hypertension, ischemic cardiomyopathy/systolic congestive heart failure, remote CVA, CAD, bilateral hearing loss, obstructive sleep apnea, and osteoarthritis/DJD-had been taking \"bottles and bottles\" of NSAIDs for years up until recently when he was prescribed Norco.  Currently patient reported that he still takes 2 full strength aspirin nightly.  Today he presented to the ED complaining of dark tarry stool with associated generalized weakness with and eventual fall last night.  He stated that for several days he has been feeling poorly/weak and lightheadedness, but denies any chest pain, shortness of air, or palpitations.  He stated that when he got up to go use the bathroom last night he felt weak certainly and fell.  He did not have loss of consciousness.  He did hit the back of his head.  His wife reported that patient was also dysarthric to the point where his speech was garbled.  He also reported numbness in both legs, but denies any other focal paresthesia or weakness.  His last colonoscopy was a screening colonoscopy about 8 to 10 years ago.  He has not had any prior EGD.  He denies any prior history of GI bleed.    Review of Systems   Otherwise complete ROS reviewed and is negative except as mentioned in the HPI.    Personal History     Past Medical History:   Diagnosis Date   • Arthritis    • Back pain    • CHF (congestive heart failure) (CMS/HCC)    • Hearing difficulty of both ears    • Hypertension    • Neck pain    • PVC (premature ventricular contraction)        Past Surgical History:   Procedure Laterality Date   • " CARDIAC ABLATION  01/30/2017    PVC ABLATION PER DR. MCINTOSH   • CARDIAC CATHETERIZATION  09/15/2016    PER DR. WAKEFIELD   • CARDIAC CATHETERIZATION N/A 9/15/2016    Procedure: Left Heart Cath;  Surgeon: Lonnie Wakefield MD;  Location:  [facility];  Service:    • CARDIAC ELECTROPHYSIOLOGY PROCEDURE N/A 1/30/2017    Procedure: EPS+/- ablation for PVC's;  Surgeon: Pj Mcintosh MD;  Location:  [facility];  Service:    • PROSTATE SURGERY         Family History: family history is not on file. Otherwise pertinent FHx was reviewed and unremarkable.     Social History:  reports that he has never smoked. He has never used smokeless tobacco. He reports that he does not drink alcohol or use drugs.  Social History     Social History Narrative   • Not on file       Medications:    Available home medication information reviewed.    No Known Allergies    Objective   Objective     Vital Signs:   Temp:  [98.2 °F (36.8 °C)-99.1 °F (37.3 °C)] 98.2 °F (36.8 °C)  Heart Rate:  [66-86] 70  Resp:  [18] 18  BP: (113-154)/(64-81) 127/70        Physical Exam   General Assessment: No acute cardiopulmonary distress.     HEENT: NCAT, PERRL, MM slightly dry    Neck: Supple, no carotid bruit bilaterally    CVS: RRR, S1S2 normal, no murmurs    Resp: CTAB, no adventitious sound    Abd: soft, NT, ND, normal BS, no guarding or peritoneal signs    Ext: No edema, both calves are symmetric and NTTP    Neuro: Cranial nerves II to XII are intact, no pronator drift, no dysmetria,  strength are equal, strength are 5 out of 5 in all extremity and symmetric, sensation grossly intact.    Skin: W/D/I. No rash.    Psych: Affect is appropriate    Results Reviewed:  I have personally reviewed current lab, radiology, and data and agree.    Results from last 7 days   Lab Units 06/01/19  1132   WBC 10*3/mm3 9.89   HEMOGLOBIN g/dL 9.8*   HEMATOCRIT % 30.2*   PLATELETS 10*3/mm3 236     Results from last 7 days   Lab Units  06/01/19  1132   SODIUM mmol/L 141   POTASSIUM mmol/L 4.3   CHLORIDE mmol/L 108*   CO2 mmol/L 20.0*   BUN mg/dL 51*   CREATININE mg/dL 1.09   GLUCOSE mg/dL 136*   CALCIUM mg/dL 9.8   ALT (SGPT) U/L 12   AST (SGOT) U/L 13   TROPONIN T ng/mL <0.010   LACTATE mmol/L 2.5*     Estimated Creatinine Clearance: 67.5 mL/min (by C-G formula based on SCr of 1.09 mg/dL).  Brief Urine Lab Results  (Last result in the past 365 days)      Color   Clarity   Blood   Leuk Est   Nitrite   Protein   CREAT   Urine HCG        06/01/19 1340 Yellow Clear Negative Trace Negative Negative             Imaging Results (last 24 hours)     Procedure Component Value Units Date/Time    CT Head Without Contrast [[id number]] Collected:  06/01/19 1314     Updated:  06/01/19 1446    Narrative:       EXAMINATION: CT HEAD WO CONTRAST - 06/01/2019     INDICATION: Head injury.      TECHNIQUE: 5 mm unenhanced images through the brain.     The radiation dose reduction device was turned on for each scan per the  ALARA (As Low as Reasonably Achievable) protocol.     COMPARISON: Brain MRI 04/30/2012.     FINDINGS: The calvarium appears intact. Paranasal sinuses and mastoids  are grossly clear. Soft tissue window images show no evidence of  hemorrhage, contusion, edema, mass or mass effect, acute infarct,  hydrocephalus or abnormal extra-axial collection. There is a small old  infarct of the right central thalamus which has occurred since 2012.       Impression:       Small old right thalamic infarct. No evidence of acute  trauma or other acute disease.     DICTATED:   06/01/2019  EDITED/ls :   06/01/2019           CT Abdomen Pelvis Without Contrast [[id number]] Collected:  06/01/19 1240     Updated:  06/01/19 1437    Narrative:       EXAMINATION: CT ABDOMEN/PELVIS WO CONTRAST - 06/01/2019      INDICATION: Left lower quadrant pain.      TECHNIQUE: 5 mm unenhanced images through the abdomen and pelvis.     The radiation dose reduction device was turned on for each  scan per the  ALARA (As Low as Reasonably Achievable) protocol.     COMPARISON: NONE     FINDINGS: History indicates blood in stool, weakness. No abdominal pain.     The included lower lungs appear grossly clear. No significant  abnormalities are seen of the liver, gallbladder, spleen, pancreas,  right adrenal gland or kidneys except for a few very faintly visible 1  mm nonobstructing calculi bilaterally. There is no evidence of  obstructive uropathy or ureteral calculus.. There is hyperplastic  appearance of the left adrenal gland. No upper abdominal free air,  ascites, adenopathy or acute inflammatory focus is identified.     Regarding the lower abdomen and pelvis, no mass, adenopathy, ascites or  acute inflammatory change is appreciated. Bladder is normally distended  and normal in appearance. There are scattered left colon diverticula.   No definite inflammatory focus is identified to indicate a  diverticulitis.       Impression:       1. Moderate left colon diverticulosis but no CT scan evidence to suggest  acute diverticulitis.  2. Very small nonobstructing bilateral renal calculi noted.  3. No evidence of acute intra-abdominal or intrapelvic disease is seen  elsewhere.     DICTATED:   06/01/2019  EDITED/ls :   06/01/2019           XR Chest 1 View [[id number]] Collected:  06/01/19 1154     Updated:  06/01/19 1328    Narrative:          EXAMINATION: XR CHEST 1 VW - 06/01/2019     INDICATION: Weakness, dizziness, altered mental status.      COMPARISON: NONE     FINDINGS: The heart is enlarged. Vasculature appears normal but the  lungs appear well expanded and clear.           Impression:       Mild cardiomegaly. No evidence of active chest disease.     DICTATED:   06/01/2019  EDITED/ls :   06/01/2019               Results for orders placed in visit on 04/25/17   SCANNED - ECHOCARDIOGRAM       Assessment/Plan   Assessment / Plan     Active Hospital Problems    Diagnosis POA   • Gastrointestinal hemorrhage with  "melena [K92.1] Yes   • Lactic acidosis [E87.2] Unknown   • Fall [W19.XXXA] Unknown   • Weakness [R53.1] Unknown   • CVA (cerebrovascular accident) (CMS/HCC) [I63.9] Yes   • Dysarthria [R47.1] Unknown   • Diverticular disease [K57.90] Unknown   • Essential hypertension [I10] Yes   • Cardiomyopathy, ischemic [I25.5] Yes     Chronic cardiomyopathy diagnosed 2013 Dr. Espinosa   Remote ECHO low EF  [facility] 9/16 Dr. Guzman EF 30%  100% RCA- MV CAD with medical therapy per Dr. Guzman  Patient declines life vest/ icd         • Chronic systolic congestive heart failure (CMS/HCC) [I50.22] Yes     -     • Bilateral hearing loss [H91.93] Yes   • LUCIA (obstructive sleep apnea) [G47.33] Yes       Salbador Apple is a 78 y.o. male with past medical history significant for essential hypertension, ischemic cardiomyopathy/systolic congestive heart failure, remote CVA, CAD, bilateral hearing loss, obstructive sleep apnea, diverticular disease and osteoarthritis/DJD-had been taking \"bottles and bottles\" of NSAIDs for years up until recently when he was prescribed Norco.  Currently patient reported that he still takes 2 full strength aspirin nightly.  Today he presented to the ED complaining of dark tarry stool with associated generalized weakness with and eventual fall last night.    Symptomatic acute anemia/Melena  -Suspect upper GI bleed given history of chronic heavy NSAIDs use over the years  -Screening colonoscopy was about 8 to 10 years ago, reported as unremarkable per patient's wife.  -Transfuse 1 unit of PRBC for symptomatic anemia, baseline hemoglobin is 14 to 15 g/dL, currently hemoglobin is 9.8.  -Monitor with serial H&H  -GI consultation, will likely need upper endoscopy, so we will keep n.p.o. after midnight.  Patient may have clear liquid diet at this time.    Lactic acidosis  -Likely from volume depletion/GI bleed  -CT abdomen/pelvis was relatively unremarkable, only diverticular disease noted.  -Transfuse 1 unit of " PRBC and continue patient on IV fluid    Dysarthria  Prior stroke  -Stroke pathway initiated  -Antiplatelet therapy held due to GI bleed    CAD   Ischemic cardiomyopathy/systolic congestive heart failure   Essential hypertension  -Continue with current medications, patient follows with Dr. Guzman  -Most recent echo in 2017, EF was 40% at that time.  Repeat echo pending.    Obstructive sleep apnea  -CPAP at night    Generalized weakness/fall  -PT/OT to evaluate and treat as indicated  - consulted for DC planning    DVT prophylaxis: SCD    CODE STATUS:    Code Status and Medical Interventions:   Ordered at: 06/01/19 1332     Level Of Support Discussed With:    Patient     Code Status:    CPR     Medical Interventions (Level of Support Prior to Arrest):    Full       Admission Status:  I believe this patient meets INPATIENT status due to the need for care which can only be reasonably provided in an hospital setting.  In such, I feel patient’s risk for adverse outcomes and need for care warrant INPATIENT evaluation and predict the patient’s care encounter to likely last beyond 2 midnights.      Electronically signed by Esthela Denis MD, 06/01/19, 1:35 PM.

## 2019-06-02 ENCOUNTER — APPOINTMENT (OUTPATIENT)
Dept: CARDIOLOGY | Facility: HOSPITAL | Age: 79
End: 2019-06-02

## 2019-06-02 PROBLEM — Z79.1 NSAID LONG-TERM USE: Status: ACTIVE | Noted: 2019-06-01

## 2019-06-02 LAB
ABO + RH BLD: NORMAL
ANION GAP SERPL CALCULATED.3IONS-SCNC: 7 MMOL/L
BH BB BLOOD EXPIRATION DATE: NORMAL
BH BB BLOOD TYPE BARCODE: 6200
BH BB DISPENSE STATUS: NORMAL
BH BB PRODUCT CODE: NORMAL
BH BB UNIT NUMBER: NORMAL
BH CV ECHO MEAS - AO MAX PG (FULL): 5.8 MMHG
BH CV ECHO MEAS - AO MAX PG: 7.1 MMHG
BH CV ECHO MEAS - AO MEAN PG (FULL): 2.4 MMHG
BH CV ECHO MEAS - AO MEAN PG: 3.1 MMHG
BH CV ECHO MEAS - AO ROOT AREA (BSA CORRECTED): 1.4
BH CV ECHO MEAS - AO ROOT AREA: 7.9 CM^2
BH CV ECHO MEAS - AO ROOT DIAM: 3.2 CM
BH CV ECHO MEAS - AO V2 MAX: 132.8 CM/SEC
BH CV ECHO MEAS - AO V2 MEAN: 77.1 CM/SEC
BH CV ECHO MEAS - AO V2 VTI: 22.8 CM
BH CV ECHO MEAS - AVA(I,A): 2.1 CM^2
BH CV ECHO MEAS - AVA(I,D): 2.1 CM^2
BH CV ECHO MEAS - AVA(V,A): 1.8 CM^2
BH CV ECHO MEAS - AVA(V,D): 1.8 CM^2
BH CV ECHO MEAS - BSA(HAYCOCK): 2.3 M^2
BH CV ECHO MEAS - BSA(HAYCOCK): 2.3 M^2
BH CV ECHO MEAS - BSA: 2.2 M^2
BH CV ECHO MEAS - BSA: 2.2 M^2
BH CV ECHO MEAS - BZI_BMI: 29.3 KILOGRAMS/M^2
BH CV ECHO MEAS - BZI_BMI: 29.3 KILOGRAMS/M^2
BH CV ECHO MEAS - BZI_METRIC_HEIGHT: 182.9 CM
BH CV ECHO MEAS - BZI_METRIC_HEIGHT: 182.9 CM
BH CV ECHO MEAS - BZI_METRIC_WEIGHT: 98 KG
BH CV ECHO MEAS - BZI_METRIC_WEIGHT: 98 KG
BH CV ECHO MEAS - EDV(CUBED): 177.2 ML
BH CV ECHO MEAS - EDV(MOD-SP2): 55 ML
BH CV ECHO MEAS - EDV(MOD-SP4): 126 ML
BH CV ECHO MEAS - EDV(TEICH): 154.7 ML
BH CV ECHO MEAS - EF(CUBED): 34.9 %
BH CV ECHO MEAS - EF(MOD-BP): 52 %
BH CV ECHO MEAS - EF(MOD-SP2): 67.3 %
BH CV ECHO MEAS - EF(MOD-SP4): 35.7 %
BH CV ECHO MEAS - EF(TEICH): 28.2 %
BH CV ECHO MEAS - ESV(CUBED): 115.3 ML
BH CV ECHO MEAS - ESV(MOD-SP2): 18 ML
BH CV ECHO MEAS - ESV(MOD-SP4): 81 ML
BH CV ECHO MEAS - ESV(TEICH): 111.1 ML
BH CV ECHO MEAS - FS: 13.3 %
BH CV ECHO MEAS - IVS/LVPW: 0.93
BH CV ECHO MEAS - IVSD: 1.1 CM
BH CV ECHO MEAS - LAD MAJOR: 4.4 CM
BH CV ECHO MEAS - LAT PEAK E' VEL: 6.8 CM/SEC
BH CV ECHO MEAS - LATERAL E/E' RATIO: 9.6
BH CV ECHO MEAS - LV DIASTOLIC VOL/BSA (35-75): 57.2 ML/M^2
BH CV ECHO MEAS - LV MASS(C)D: 272.4 GRAMS
BH CV ECHO MEAS - LV MASS(C)DI: 123.7 GRAMS/M^2
BH CV ECHO MEAS - LV MAX PG: 1.3 MMHG
BH CV ECHO MEAS - LV MEAN PG: 0.64 MMHG
BH CV ECHO MEAS - LV SYSTOLIC VOL/BSA (12-30): 36.8 ML/M^2
BH CV ECHO MEAS - LV V1 MAX: 56.3 CM/SEC
BH CV ECHO MEAS - LV V1 MEAN: 36.7 CM/SEC
BH CV ECHO MEAS - LV V1 VTI: 10.8 CM
BH CV ECHO MEAS - LVIDD: 5.6 CM
BH CV ECHO MEAS - LVIDS: 4.9 CM
BH CV ECHO MEAS - LVLD AP2: 7.5 CM
BH CV ECHO MEAS - LVLD AP4: 8.5 CM
BH CV ECHO MEAS - LVLS AP2: 5.7 CM
BH CV ECHO MEAS - LVLS AP4: 6.9 CM
BH CV ECHO MEAS - LVOT AREA (M): 4.2 CM^2
BH CV ECHO MEAS - LVOT AREA: 4.3 CM^2
BH CV ECHO MEAS - LVOT DIAM: 2.3 CM
BH CV ECHO MEAS - LVPWD: 1.2 CM
BH CV ECHO MEAS - MED PEAK E' VEL: 4.2 CM/SEC
BH CV ECHO MEAS - MEDIAL E/E' RATIO: 15.4
BH CV ECHO MEAS - MV A MAX VEL: 59.2 CM/SEC
BH CV ECHO MEAS - MV DEC TIME: 0.17 SEC
BH CV ECHO MEAS - MV E MAX VEL: 66.6 CM/SEC
BH CV ECHO MEAS - MV E/A: 1.1
BH CV ECHO MEAS - MV MAX PG: 3.3 MMHG
BH CV ECHO MEAS - MV MEAN PG: 0.72 MMHG
BH CV ECHO MEAS - MV V2 MAX: 91.2 CM/SEC
BH CV ECHO MEAS - MV V2 MEAN: 36 CM/SEC
BH CV ECHO MEAS - MV V2 VTI: 22.8 CM
BH CV ECHO MEAS - MVA(VTI): 2.1 CM^2
BH CV ECHO MEAS - PA ACC SLOPE: 347.1 CM/SEC^2
BH CV ECHO MEAS - PA ACC TIME: 0.15 SEC
BH CV ECHO MEAS - PA MAX PG: 1.8 MMHG
BH CV ECHO MEAS - PA PR(ACCEL): 12.5 MMHG
BH CV ECHO MEAS - PA V2 MAX: 66.5 CM/SEC
BH CV ECHO MEAS - RVSP: 23 MMHG
BH CV ECHO MEAS - SI(AO): 82.2 ML/M^2
BH CV ECHO MEAS - SI(CUBED): 28.1 ML/M^2
BH CV ECHO MEAS - SI(LVOT): 21.3 ML/M^2
BH CV ECHO MEAS - SI(MOD-SP2): 16.8 ML/M^2
BH CV ECHO MEAS - SI(MOD-SP4): 20.4 ML/M^2
BH CV ECHO MEAS - SI(TEICH): 19.8 ML/M^2
BH CV ECHO MEAS - SV(AO): 180.8 ML
BH CV ECHO MEAS - SV(CUBED): 61.9 ML
BH CV ECHO MEAS - SV(LVOT): 46.9 ML
BH CV ECHO MEAS - SV(MOD-SP2): 37 ML
BH CV ECHO MEAS - SV(MOD-SP4): 45 ML
BH CV ECHO MEAS - SV(TEICH): 43.6 ML
BH CV ECHO MEAS - TAPSE (>1.6): 2.9 CM2
BH CV ECHO MEAS - TR MAX PG: 20 MMHG
BH CV ECHO MEAS - TR MAX VEL: 217.7 CM/SEC
BH CV ECHO MEASUREMENTS AVERAGE E/E' RATIO: 12.11
BH CV VAS BP RIGHT ARM: NORMAL MMHG
BH CV XLRA - TDI S': 14.8 CM/SEC
BH CV XLRA MEAS LEFT CCA RATIO VEL: 93.7 CM/SEC
BH CV XLRA MEAS LEFT DIST CCA EDV: 13.8 CM/SEC
BH CV XLRA MEAS LEFT DIST CCA PSV: 94.3 CM/SEC
BH CV XLRA MEAS LEFT DIST ICA EDV: 39.2 CM/SEC
BH CV XLRA MEAS LEFT DIST ICA PSV: 110.8 CM/SEC
BH CV XLRA MEAS LEFT ICA RATIO VEL: 110 CM/SEC
BH CV XLRA MEAS LEFT ICA/CCA RATIO: 1.2
BH CV XLRA MEAS LEFT MID CCA EDV: 28.7 CM/SEC
BH CV XLRA MEAS LEFT MID CCA PSV: 88.8 CM/SEC
BH CV XLRA MEAS LEFT MID ICA EDV: 27.1 CM/SEC
BH CV XLRA MEAS LEFT MID ICA PSV: 71.9 CM/SEC
BH CV XLRA MEAS LEFT PROX CCA EDV: 31.4 CM/SEC
BH CV XLRA MEAS LEFT PROX CCA PSV: 94.3 CM/SEC
BH CV XLRA MEAS LEFT PROX ECA PSV: 68.8 CM/SEC
BH CV XLRA MEAS LEFT PROX ICA EDV: 32.6 CM/SEC
BH CV XLRA MEAS LEFT PROX ICA PSV: 95.5 CM/SEC
BH CV XLRA MEAS LEFT PROX SCLA PSV: 121.9 CM/SEC
BH CV XLRA MEAS LEFT VERTEBRAL A EDV: 47.4 CM/SEC
BH CV XLRA MEAS LEFT VERTEBRAL A PSV: 111 CM/SEC
BH CV XLRA MEAS RIGHT CCA RATIO VEL: 71.2 CM/SEC
BH CV XLRA MEAS RIGHT DIST CCA EDV: 14 CM/SEC
BH CV XLRA MEAS RIGHT DIST CCA PSV: 67.2 CM/SEC
BH CV XLRA MEAS RIGHT DIST ICA EDV: 43 CM/SEC
BH CV XLRA MEAS RIGHT DIST ICA PSV: 114.7 CM/SEC
BH CV XLRA MEAS RIGHT ICA RATIO VEL: 114 CM/SEC
BH CV XLRA MEAS RIGHT ICA/CCA RATIO: 1.6
BH CV XLRA MEAS RIGHT MID CCA EDV: 15.3 CM/SEC
BH CV XLRA MEAS RIGHT MID CCA PSV: 71.6 CM/SEC
BH CV XLRA MEAS RIGHT MID ICA EDV: 30.9 CM/SEC
BH CV XLRA MEAS RIGHT MID ICA PSV: 94.3 CM/SEC
BH CV XLRA MEAS RIGHT PROX CCA EDV: 13.1 CM/SEC
BH CV XLRA MEAS RIGHT PROX CCA PSV: 69.4 CM/SEC
BH CV XLRA MEAS RIGHT PROX ECA EDV: 11.3 CM/SEC
BH CV XLRA MEAS RIGHT PROX ECA PSV: 80.5 CM/SEC
BH CV XLRA MEAS RIGHT PROX ICA EDV: 37.3 CM/SEC
BH CV XLRA MEAS RIGHT PROX ICA PSV: 65.8 CM/SEC
BH CV XLRA MEAS RIGHT PROX SCLA PSV: 91 CM/SEC
BH CV XLRA MEAS RIGHT VERTEBRAL A EDV: 34.7 CM/SEC
BH CV XLRA MEAS RIGHT VERTEBRAL A PSV: 103 CM/SEC
BUN BLD-MCNC: 54 MG/DL (ref 8–23)
BUN/CREAT SERPL: 61.4 (ref 7–25)
CALCIUM SPEC-SCNC: 9.1 MG/DL (ref 8.6–10.5)
CHLORIDE SERPL-SCNC: 112 MMOL/L (ref 98–107)
CHOLEST SERPL-MCNC: 136 MG/DL (ref 0–200)
CO2 SERPL-SCNC: 21 MMOL/L (ref 22–29)
CREAT BLD-MCNC: 0.88 MG/DL (ref 0.76–1.27)
GFR SERPL CREATININE-BSD FRML MDRD: 84 ML/MIN/1.73
GLUCOSE BLD-MCNC: 110 MG/DL (ref 65–99)
HBA1C MFR BLD: 5.5 % (ref 4.8–5.6)
HCT VFR BLD AUTO: 23.6 % (ref 37.5–51)
HCT VFR BLD AUTO: 23.7 % (ref 37.5–51)
HCT VFR BLD AUTO: 24.9 % (ref 37.5–51)
HDLC SERPL-MCNC: 32 MG/DL (ref 40–60)
HGB BLD-MCNC: 7.6 G/DL (ref 13–17.7)
HGB BLD-MCNC: 7.6 G/DL (ref 13–17.7)
HGB BLD-MCNC: 8.1 G/DL (ref 13–17.7)
LDLC SERPL CALC-MCNC: 70 MG/DL (ref 0–100)
LDLC/HDLC SERPL: 2.18 {RATIO}
LEFT ARM BP: NORMAL MMHG
LEFT ATRIUM VOLUME INDEX: 19.1 ML/M^2
LEFT ATRIUM VOLUME: 42 ML
MAXIMAL PREDICTED HEART RATE: 142 BPM
POTASSIUM BLD-SCNC: 4.3 MMOL/L (ref 3.5–5.2)
RIGHT ARM BP: NORMAL MMHG
SODIUM BLD-SCNC: 140 MMOL/L (ref 136–145)
STRESS TARGET HR: 121 BPM
TRIGL SERPL-MCNC: 171 MG/DL (ref 0–150)
UNIT  ABO: NORMAL
UNIT  RH: NORMAL
VLDLC SERPL-MCNC: 34.2 MG/DL

## 2019-06-02 PROCEDURE — 85018 HEMOGLOBIN: CPT | Performed by: HOSPITALIST

## 2019-06-02 PROCEDURE — 80061 LIPID PANEL: CPT | Performed by: HOSPITALIST

## 2019-06-02 PROCEDURE — 25010000002 ONDANSETRON PER 1 MG

## 2019-06-02 PROCEDURE — 97166 OT EVAL MOD COMPLEX 45 MIN: CPT

## 2019-06-02 PROCEDURE — 97162 PT EVAL MOD COMPLEX 30 MIN: CPT

## 2019-06-02 PROCEDURE — 99232 SBSQ HOSP IP/OBS MODERATE 35: CPT | Performed by: INTERNAL MEDICINE

## 2019-06-02 PROCEDURE — 99233 SBSQ HOSP IP/OBS HIGH 50: CPT | Performed by: INTERNAL MEDICINE

## 2019-06-02 PROCEDURE — 83036 HEMOGLOBIN GLYCOSYLATED A1C: CPT | Performed by: HOSPITALIST

## 2019-06-02 PROCEDURE — 93880 EXTRACRANIAL BILAT STUDY: CPT | Performed by: INTERNAL MEDICINE

## 2019-06-02 PROCEDURE — 85014 HEMATOCRIT: CPT | Performed by: HOSPITALIST

## 2019-06-02 PROCEDURE — 92610 EVALUATE SWALLOWING FUNCTION: CPT

## 2019-06-02 PROCEDURE — 93306 TTE W/DOPPLER COMPLETE: CPT

## 2019-06-02 PROCEDURE — 93880 EXTRACRANIAL BILAT STUDY: CPT

## 2019-06-02 PROCEDURE — 99223 1ST HOSP IP/OBS HIGH 75: CPT | Performed by: PSYCHIATRY & NEUROLOGY

## 2019-06-02 PROCEDURE — 93306 TTE W/DOPPLER COMPLETE: CPT | Performed by: INTERNAL MEDICINE

## 2019-06-02 PROCEDURE — 92523 SPEECH SOUND LANG COMPREHEN: CPT

## 2019-06-02 PROCEDURE — 80048 BASIC METABOLIC PNL TOTAL CA: CPT | Performed by: HOSPITALIST

## 2019-06-02 RX ORDER — ONDANSETRON 2 MG/ML
INJECTION INTRAMUSCULAR; INTRAVENOUS
Status: COMPLETED
Start: 2019-06-02 | End: 2019-06-02

## 2019-06-02 RX ORDER — ONDANSETRON 2 MG/ML
4 INJECTION INTRAMUSCULAR; INTRAVENOUS EVERY 6 HOURS PRN
Status: DISCONTINUED | OUTPATIENT
Start: 2019-06-02 | End: 2019-06-04 | Stop reason: HOSPADM

## 2019-06-02 RX ADMIN — HYDROCODONE BITARTRATE AND ACETAMINOPHEN 1 TABLET: 7.5; 325 TABLET ORAL at 02:06

## 2019-06-02 RX ADMIN — METOPROLOL SUCCINATE 50 MG: 50 TABLET, EXTENDED RELEASE ORAL at 06:17

## 2019-06-02 RX ADMIN — HYDROCODONE BITARTRATE AND ACETAMINOPHEN 1 TABLET: 7.5; 325 TABLET ORAL at 07:47

## 2019-06-02 RX ADMIN — PANTOPRAZOLE SODIUM 40 MG: 40 INJECTION, POWDER, FOR SOLUTION INTRAVENOUS at 07:38

## 2019-06-02 RX ADMIN — SPIRONOLACTONE 25 MG: 25 TABLET ORAL at 06:17

## 2019-06-02 RX ADMIN — HYDROCODONE BITARTRATE AND ACETAMINOPHEN 1 TABLET: 7.5; 325 TABLET ORAL at 13:33

## 2019-06-02 RX ADMIN — SACUBITRIL AND VALSARTAN 1 TABLET: 24; 26 TABLET, FILM COATED ORAL at 06:17

## 2019-06-02 RX ADMIN — SODIUM CHLORIDE, PRESERVATIVE FREE 3 ML: 5 INJECTION INTRAVENOUS at 20:31

## 2019-06-02 RX ADMIN — PANTOPRAZOLE SODIUM 40 MG: 40 INJECTION, POWDER, FOR SOLUTION INTRAVENOUS at 18:00

## 2019-06-02 RX ADMIN — ATORVASTATIN CALCIUM 40 MG: 40 TABLET, FILM COATED ORAL at 20:30

## 2019-06-02 RX ADMIN — ONDANSETRON 4 MG: 2 INJECTION INTRAMUSCULAR; INTRAVENOUS at 13:32

## 2019-06-02 RX ADMIN — AMLODIPINE BESYLATE 5 MG: 5 TABLET ORAL at 06:17

## 2019-06-02 RX ADMIN — SODIUM CHLORIDE 75 ML/HR: 9 INJECTION, SOLUTION INTRAVENOUS at 03:27

## 2019-06-02 RX ADMIN — HYDROCODONE BITARTRATE AND ACETAMINOPHEN 1 TABLET: 7.5; 325 TABLET ORAL at 18:39

## 2019-06-02 NOTE — PROGRESS NOTES
[facility]  PROGRESS NOTE    Patient Name: Salbador Apple  : 1940  MRN: [id number]    Date of Admission: 2019  Length of Stay: 1  Primary Care Physician: Kumar Rodriguez MD    Subjective   Subjective     CC:f/u GI bleed    HPI: No acute events overnight, patient said that he is doing okay just feels weak.  Did not get enough sleep last night.    Review of Systems  Gen- No fevers, chills  CV- No chest pain, palpitations  Resp- No cough, dyspnea  GI- No N/V/D, abd pain    Otherwise ROS is negative except as mentioned in the HPI.    Objective   Objective     Vital Signs:   Temp:  [98 °F (36.7 °C)-99.1 °F (37.3 °C)] 98.2 °F (36.8 °C)  Heart Rate:  [61-86] 61  Resp:  [18] 18  BP: (113-154)/(64-85) 117/68  Total (NIH Stroke Scale): 3     Physical Exam:  Constitutional: Chronically ill-appearing male in no acute distress , awake, alert  HENT: NCAT, mucous membranes moist  Respiratory: Clear to auscultation bilaterally, respiratory effort normal   Cardiovascular: RRR, no murmurs, rubs, or gallops, palpable pedal pulses bilaterally  Gastrointestinal: Positive bowel sounds, soft, nontender, nondistended  Musculoskeletal: No bilateral ankle edema  Psychiatric: Appropriate affect, cooperative  Neurologic: Oriented x 3, generalized weakness, slurred speech  Skin: No rashes    Results Reviewed:  I have personally reviewed current lab, radiology, and data and agree.    Results from last 7 days   Lab Units 19  0459 19  0211 19  1749 19  1132   WBC 10*3/mm3  --   --   --  9.89   HEMOGLOBIN g/dL 7.6* 8.1* 8.6* 9.8*   HEMATOCRIT % 23.6* 24.9* 26.7* 30.2*   PLATELETS 10*3/mm3  --   --   --  236     Results from last 7 days   Lab Units 19  0459 19  1132   SODIUM mmol/L 140 141   POTASSIUM mmol/L 4.3 4.3   CHLORIDE mmol/L 112* 108*   CO2 mmol/L 21.0* 20.0*   BUN mg/dL 54* 51*   CREATININE mg/dL 0.88 1.09   GLUCOSE mg/dL 110* 136*    CALCIUM mg/dL 9.1 9.8   ALT (SGPT) U/L  --  12   AST (SGOT) U/L  --  13   TROPONIN T ng/mL  --  <0.010     Estimated Creatinine Clearance: 84 mL/min (by C-G formula based on SCr of 0.88 mg/dL).    Microbiology Results Abnormal     None          Imaging Results (last 24 hours)     Procedure Component Value Units Date/Time    CT Abdomen Pelvis Without Contrast [[id number]] Collected:  06/01/19 1240     Updated:  06/01/19 2249    Narrative:       EXAMINATION: CT ABDOMEN/PELVIS WO CONTRAST - 06/01/2019      INDICATION: Left lower quadrant pain.      TECHNIQUE: 5 mm unenhanced images through the abdomen and pelvis.     The radiation dose reduction device was turned on for each scan per the  ALARA (As Low as Reasonably Achievable) protocol.     COMPARISON: NONE     FINDINGS: History indicates blood in stool, weakness. No abdominal pain.     The included lower lungs appear grossly clear. No significant  abnormalities are seen of the liver, gallbladder, spleen, pancreas,  right adrenal gland or kidneys except for a few very faintly visible 1  mm nonobstructing calculi bilaterally. There is no evidence of  obstructive uropathy or ureteral calculus.. There is hyperplastic  appearance of the left adrenal gland. No upper abdominal free air,  ascites, adenopathy or acute inflammatory focus is identified.     Regarding the lower abdomen and pelvis, no mass, adenopathy, ascites or  acute inflammatory change is appreciated. Bladder is normally distended  and normal in appearance. There are scattered left colon diverticula.   No definite inflammatory focus is identified to indicate a  diverticulitis.       Impression:       1. Moderate left colon diverticulosis but no CT scan evidence to suggest  acute diverticulitis.  2. Very small nonobstructing bilateral renal calculi noted.  3. No evidence of acute intra-abdominal or intrapelvic disease is seen  elsewhere.     DICTATED:   06/01/2019  EDITED/ls :   06/01/2019         This report  was finalized on 6/1/2019 10:46 PM by DR. Frank Selby MD.       CT Head Without Contrast [[id number]] Collected:  06/01/19 1314     Updated:  06/01/19 2202    Narrative:       EXAMINATION: CT HEAD WO CONTRAST - 06/01/2019     INDICATION: Head injury.      TECHNIQUE: 5 mm unenhanced images through the brain.     The radiation dose reduction device was turned on for each scan per the  ALARA (As Low as Reasonably Achievable) protocol.     COMPARISON: Brain MRI 04/30/2012.     FINDINGS: The calvarium appears intact. Paranasal sinuses and mastoids  are grossly clear. Soft tissue window images show no evidence of  hemorrhage, contusion, edema, mass or mass effect, acute infarct,  hydrocephalus or abnormal extra-axial collection. There is a small old  infarct of the right central thalamus which has occurred since 2012.       Impression:       Small old right thalamic infarct. No evidence of acute  trauma or other acute disease.     DICTATED:   06/01/2019  EDITED/ls :   06/01/2019         This report was finalized on 6/1/2019 9:59 PM by DR. Frank Selby MD.       XR Chest 1 View [[id number]] Collected:  06/01/19 1154     Updated:  06/01/19 2135    Narrative:          EXAMINATION: XR CHEST 1 VW - 06/01/2019     INDICATION: Weakness, dizziness, altered mental status.      COMPARISON: NONE     FINDINGS: The heart is enlarged. Vasculature appears normal but the  lungs appear well expanded and clear.           Impression:       Mild cardiomegaly. No evidence of active chest disease.     DICTATED:   06/01/2019  EDITED/ls :   06/01/2019      This report was finalized on 6/1/2019 9:31 PM by DR. Frank Selby MD.       MRI Brain Without Contrast [[id number]] Collected:  06/01/19 1929     Updated:  06/01/19 1935    Narrative:       EXAMINATION: MRI BRAIN WO CONTRAST-     INDICATION: Stroke; K92.1-Melena; R55-Syncope and collapse; Z79.1-Long  term (current) use of non-steroidal anti-inflammatories (nsaid)     TECHNIQUE: Sagittal and axial T1  and axial T2 FLAIR diffusion-weighted  is, axial T2 flash images     COMPARISON: Unenhanced CT scan of same date     FINDINGS: History indicates garbled speech and lightheadedness, lower  extremity numbness.     No restricted diffusion is seen to suggest acute infarction. Remaining  imaging sequences show expected degree of generalized cerebral atrophy  for age. 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 There is no evidence of  edema or hemorrhage hydrocephalus or abnormal extra-axial collection.  There is a small central right thalamic infarct and very small old  central right cerebellar infarct..          Impression:       1. Chronic central white matter changes and small old central right  thalamic lacunar infarct. Small old right cerebellar infarct. No  evidence of acute intracranial disease.                 Results for orders placed in visit on 04/25/17   SCANNED - ECHOCARDIOGRAM       I have reviewed the medications:    Current Facility-Administered Medications:   •  amLODIPine (NORVASC) tablet 5 mg, 5 mg, Oral, Cira QUIROGA Phonekeo, MD, 5 mg at 06/02/19 0617  •  atorvastatin (LIPITOR) tablet 40 mg, 40 mg, Oral, Nightly, Esthela Denis MD, 40 mg at 06/01/19 2028  •  HYDROcodone-acetaminophen (NORCO) 7.5-325 MG per tablet 1 tablet, 1 tablet, Oral, Q6H PRN, Esthela Denis MD, 1 tablet at 06/02/19 0747  •  metoprolol succinate XL (TOPROL-XL) 24 hr tablet 50 mg, 50 mg, Oral, Cira QUIROGA Phonekeo, MD, 50 mg at 06/02/19 0617  •  pantoprazole (PROTONIX) injection 40 mg, 40 mg, Intravenous, BID AC, Brunner, Mark I, MD, 40 mg at 06/02/19 0738  •  sacubitril-valsartan (ENTRESTO) 24-26 MG tablet 1 tablet, 1 tablet, Oral, Cira QUIROGA Phonekeo, MD, 1 tablet at 06/02/19 0617  •  sodium chloride 0.9 % flush 10 mL, 10 mL, Intravenous, PRN, Jai Cook MD  •  sodium chloride 0.9 % flush 3 mL, 3 mL, Intravenous, Q12H,  "Esthela Denis MD, 3 mL at 06/01/19 2031  •  sodium chloride 0.9 % flush 3-10 mL, 3-10 mL, Intravenous, PRN, Esthela Denis MD  •  sodium chloride 0.9 % infusion, 75 mL/hr, Intravenous, Continuous, Esthela Denis MD, Last Rate: 75 mL/hr at 06/02/19 0327, 75 mL/hr at 06/02/19 0327  •  spironolactone (ALDACTONE) tablet 25 mg, 25 mg, Oral, QAMCira Phonekeo, MD, 25 mg at 06/02/19 0617      Assessment/Plan   Assessment / Plan     Active Hospital Problems    Diagnosis POA   • **Gastrointestinal hemorrhage with melena [K92.1] Yes   • Lactic acidosis [E87.2] Unknown   • Fall [W19.XXXA] Unknown   • Weakness [R53.1] Unknown   • CVA (cerebrovascular accident) (CMS/HCC) [I63.9] Yes   • Dysarthria [R47.1] Unknown   • Diverticular disease [K57.90] Unknown   • Essential hypertension [I10] Yes   • Cardiomyopathy, ischemic [I25.5] Yes     Chronic cardiomyopathy diagnosed 2013 Dr. Espinosa   Remote ECHO low EF  [facility] 9/16 Dr. Guzman EF 30%  100% RCA- MV CAD with medical therapy per Dr. Guzman  Patient declines life vest/ icd         • Chronic systolic congestive heart failure (CMS/HCC) [I50.22] Yes     -     • Bilateral hearing loss [H91.93] Yes   • LUCIA (obstructive sleep apnea) [G47.33] Yes        Brief Hospital Course to date:  Salbador Apple is a 78 y.o. male with past medical history significant for essential hypertension, ischemic cardiomyopathy/systolic congestive heart failure, remote CVA, CAD, bilateral hearing loss, obstructive sleep apnea, diverticular disease and osteoarthritis/DJD-had been taking \"bottles and bottles\" of NSAIDs for years up until recently when he was prescribed Norco.  Currently patient reported that he still takes 2 full strength aspirin nightly.  Today he presented to the ED complaining of dark tarry stool with associated generalized weakness with and eventual fall last night.     Symptomatic acute anemia/Melena  -Suspect upper GI bleed given history of chronic " heavy NSAIDs use over the years  -Screening colonoscopy was about 8 to 10 years ago, reported as unremarkable per patient's wife.  -s/p 1 unit of PRBC for symptomatic anemia, baseline hemoglobin is 14 to 15 g/dL, currently hemoglobin is 9.8.  -Monitor with serial H&H  -GI following [megan EGD for Monday 6/3, sooner if bleeding worse, needs repeat echo prior, NPO after midnight.  Patient may have clear liquid diet at this time.     Lactic acidosis, improving  -Likely from volume depletion/GI bleed  -CT abdomen/pelvis was relatively unremarkable, only diverticular disease noted.  -s/p 1 unit of PRBC disontinue IV fluid.     Dysarthria/Prior stroke  -Stroke protocol initiated,MRI shows old infarcts, nothing acute, awaiting neurology eval  -Antiplatelet therapy held due to GI bleed     CAD/Ischemic cardiomyopathy/systolic congestive heart failure/Essential hypertension  -Continue with current medications, patient follows with Dr. Guzman  -Most recent echo in 2017, EF was 40% at that time.  Repeat echo pending.     Obstructive sleep apnea  -CPAP at night     Generalized weakness/fall  -PT/OT to evaluate and treat as indicated  - consulted for DC planning     DVT prophylaxis: SCD     Disposition: TBD    CODE STATUS:   Code Status and Medical Interventions:   Ordered at: 06/01/19 1332     Level Of Support Discussed With:    Patient     Code Status:    CPR     Medical Interventions (Level of Support Prior to Arrest):    Full       Electronically signed by Mitchell Kolb MD, 06/02/19, 9:32 AM.

## 2019-06-02 NOTE — THERAPY EVALUATION
Acute Care - Occupational Therapy Initial Evaluation  [facility]     Patient Name: Salbador Apple  : 1940  MRN: [id number]  Today's Date: 2019  Onset of Illness/Injury or Date of Surgery: 19  Date of Referral to OT: 19  Referring Physician: Dr Denis    Admit Date: 2019       ICD-10-CM ICD-9-CM   1. Gastrointestinal hemorrhage with melena K92.1 578.1   2. Syncope and collapse R55 780.2   3. NSAID long-term use Z79.1 V58.64   4. Impaired functional mobility, balance, gait, and endurance Z74.09 V49.89     Patient Active Problem List   Diagnosis   • Essential hypertension   • PVC's (premature ventricular contractions)   • Cardiomyopathy, ischemic   • Chronic systolic congestive heart failure (CMS/HCC)   • Bilateral hearing loss   • LUCIA (obstructive sleep apnea)   • Beta-blocker intolerance   • Gastrointestinal hemorrhage with melena   • Lactic acidosis   • CVA (cerebrovascular accident) (CMS/HCC)   • Fall   • Weakness   • Dysarthria   • Diverticular disease     Past Medical History:   Diagnosis Date   • Arthritis    • Back pain    • CHF (congestive heart failure) (CMS/HCC)    • Hearing difficulty of both ears    • Hypertension    • Neck pain    • PVC (premature ventricular contraction)      Past Surgical History:   Procedure Laterality Date   • CARDIAC ABLATION  2017    PVC ABLATION PER DR. PETERSON   • CARDIAC CATHETERIZATION  09/15/2016    PER DR. GUZMAN   • CARDIAC CATHETERIZATION N/A 9/15/2016    Procedure: Left Heart Cath;  Surgeon: Lonnie Guzman MD;  Location:  [facility];  Service:    • CARDIAC ELECTROPHYSIOLOGY PROCEDURE N/A 2017    Procedure: EPS+/- ablation for PVC's;  Surgeon: Pj Peterson MD;  Location:  [facility] EP [facility];  Service:    • PROSTATE SURGERY            OT ASSESSMENT FLOWSHEET (last 12 hours)      Occupational Therapy Evaluation     Row Name 19 1315                   OT Evaluation Time/Intention     Subjective Information  complains of;nausea/vomiting  -TA        Document Type  evaluation  -TA        Mode of Treatment  occupational therapy  -TA        Patient Effort  good  -TA        Symptoms Noted During/After Treatment  dizziness  -TA        Comment  Pt c/o dizziness upon standing, returned to sitting, VSS  -TA           General Information    Patient Profile Reviewed?  yes  -TA        Onset of Illness/Injury or Date of Surgery  06/01/19  -TA        Referring Physician  Dr Denis  -TA        Patient Observations  alert;cooperative;agree to therapy  -TA        Patient/Family Observations  spouse present in room  -TA        General Observations of Patient  Pt in fowlers in bed, RA, IV heplocked.  -TA        Prior Level of Function  independent:;all household mobility;community mobility;gait;transfer;bed mobility;ADL's;driving;using stairs  -TA        Equipment Currently Used at Home  none  -TA        Pertinent History of Current Functional Problem  Pt to ED with c/o dark, tarry stools with weakness and fall at home. MRI brain (-) for acute infarct; being followed for GI hemmorhage with melena; PMH/o OA/DJD, remote CVA  -TA        Existing Precautions/Restrictions  fall  -TA        Limitations/Impairments  hearing Pt is [place]  -TA        Risks Reviewed  patient:;spouse/S.O.:;LOB;nausea/vomiting;dizziness;increased discomfort;change in vital signs  -TA        Benefits Reviewed  patient:;spouse/S.O.:;improve function;increase independence;increase strength;increase balance;decrease pain;increase knowledge  -TA        Barriers to Rehab  none identified  -TA           Relationship/Environment    Primary Source of Support/Comfort  spouse  -TA        Lives With  spouse  -TA        Family Caregiver if Needed  spouse  -TA           Resource/Environmental Concerns    Current Living Arrangements  home/apartment/condo  -TA        Resource/Environmental Concerns  none  -TA           Home Main Entrance    Number of  Stairs, Main Entrance  one  -TA           Cognitive Assessment/Intervention- PT/OT    Orientation Status (Cognition)  oriented x 3  -TA        Follows Commands (Cognition)  WFL;follows one step commands;over 90% accuracy  -TA        Safety Deficit (Cognitive)  mild deficit;awareness of need for assistance;insight into deficits/self awareness;safety precautions awareness;safety precautions follow-through/compliance  -TA           Safety Issues, Functional Mobility    Safety Issues Affecting Function (Mobility)  awareness of need for assistance;insight into deficits/self awareness;safety precaution awareness;safety precautions follow-through/compliance  -TA        Impairments Affecting Function (Mobility)  balance;coordination;endurance/activity tolerance;strength  -TA           Bed Mobility Assessment/Treatment    Bed Mobility Assessment/Treatment  supine-sit;sit-supine  -TA        Supine-Sit [place] (Bed Mobility)  conditional independence  -TA        Sit-Supine [place] (Bed Mobility)  conditional independence  -TA           Transfer Assessment/Treatment    Transfer Assessment/Treatment  sit-stand transfer;stand-sit transfer  -TA        Comment (Transfers)  V Cs for safe technique  -TA           Sit-Stand Transfer    Sit-Stand [place] (Transfers)  contact guard;verbal cues  -TA        Assistive Device (Sit-Stand Transfers)  other (see comments)  no AD  -TA           Stand-Sit Transfer    Stand-Sit [place] (Transfers)  contact guard;verbal cues  -TA        Assistive Device (Stand-Sit Transfers)  other (see comments) No AD  -TA           ADL Assessment/Intervention    BADL Assessment/Intervention  lower body dressing;toileting  -TA           Lower Body Dressing Assessment/Training    Lower Body Dressing [place] Level  don;doff;socks;supervision  -TA        Lower Body Dressing Position  edge of bed sitting  -TA           Toileting Assessment/Training    [place] Level (Toileting)   adjust/manage clothing;perform perineal hygiene;supervision  -TA        Comment (Toileting)  Clinical projection of fxl level  -TA           BADL Safety/Performance    Impairments, BADL Safety/Performance  balance;endurance/activity tolerance;coordination;strength  -TA        Skilled BADL Treatment/Intervention  BADL process/adaptation training  -TA           General ROM    GENERAL ROM COMMENTS  BUE AROM WFL  -TA           MMT (Manual Muscle Testing)    General MMT Comments  BUE WFL  -TA           Motor Assessment/Interventions    Additional Documentation  Balance (Group);Balance Interventions (Group)  -TA           Balance    Balance  dynamic standing balance;dynamic sitting balance  -TA           Dynamic Sitting Balance    Level of [place], Reaches Outside Midline (Sitting, Dynamic Balance)  supervision  -TA        Sitting Position, Reaches Outside Midline (Sitting, Dynamic Balance)  sitting on edge of bed  -TA        Comment, Reaches Outside Midline (Sitting, Dynamic Balance)  LBD, MMT  -TA           Dynamic Standing Balance    Level of [place], Reaches Outside Midline (Standing, Dynamic Balance)  contact guard assist  -TA        Time Able to Maintain Position, Reaches Outside Midline (Standing, Dynamic Balance)  45 to 60 seconds  -TA        Assistive Device Utilized (Supported Standing, Dynamic Balance)  other (see comments) no AD  -TA           Sensory Assessment/Intervention    Sensory General Assessment  no sensation deficits identified;other (see comments) BUE intact  -TA           Positioning and Restraints    Pre-Treatment Position  in bed  -TA        Post Treatment Position  bed  -TA           Pain Assessment    Additional Documentation  Pain Scale: Numbers Pre/Post-Treatment (Group)  -TA           Pain Scale: Numbers Pre/Post-Treatment    Pain Scale: Numbers, Pretreatment  0/10 - no pain  -TA        Pain Scale: Numbers, Post-Treatment  0/10 - no pain  -TA        Pre/Post Treatment Pain Comment   tolerated  -TA        Pain Intervention(s)  Repositioned;Ambulation/increased activity  -TA           Coping    Observed Emotional State  calm;cooperative  -TA        Verbalized Emotional State  acceptance  -TA           Plan of Care Review    Plan of Care Reviewed With  patient;spouse  -TA           Clinical Impression (OT)    Date of Referral to OT  06/01/19  -TA        OT Diagnosis  Impaired mobility and ADLs  -TA        Functional Level at Time of Evaluation (OT Eval)  fxl decline from PLOF  -TA        Patient/Family Goals Statement (OT Eval)  feel stronger  -TA        Criteria for Skilled Therapeutic Interventions Met (OT Eval)  yes;treatment indicated  -TA        Rehab Potential (OT Eval)  good, to achieve stated therapy goals  -TA        Therapy Frequency (OT Eval)  daily Per priority policy  -TA        Anticipated Discharge Disposition (OT)  home with assist  -TA           Vital Signs    Pre Systolic BP Rehab  93  -TA        Pre Treatment Diastolic BP  51  -TA        Post Systolic BP Rehab  132  -TA        Post Treatment Diastolic BP  76  -TA        Pre Patient Position  Supine  -TA        Intra Patient Position  Standing  -TA        Post Patient Position  Supine  -TA           Planned OT Interventions    Planned Therapy Interventions (OT Eval)  activity tolerance training;BADL retraining;functional balance retraining;occupation/activity based interventions;ROM/therapeutic exercise;strengthening exercise;transfer/mobility retraining  -TA           OT Goals    Transfer Goal Selection (OT)  transfer, OT goal 1  -TA        Dressing Goal Selection (OT)  dressing, OT goal 1  -TA        Activity Tolerance Goal Selection (OT)  activity tolerance, OT goal 1  -TA        Additional Documentation  Activity Tolerance Goal Selection (OT) (Row)  -TA           Transfer Goal 1 (OT)    Activity/Assistive Device (Transfer Goal 1, OT)  sit-to-stand/stand-to-sit;bed-to-chair/chair-to-bed;toilet  -TA        [place]  Level/Cues Needed (Transfer Goal 1, OT)  independent  -TA        Time Frame (Transfer Goal 1, OT)  by discharge  -TA        Progress/Outcome (Transfer Goal 1, OT)  goal ongoing  -TA           Dressing Goal 1 (OT)    Activity/Assistive Device (Dressing Goal 1, OT)  lower body dressing;other (see comments) don/doff pants  -TA        [place]/Cues Needed (Dressing Goal 1, OT)  independent  -TA        Time Frame (Dressing Goal 1, OT)  by discharge  -TA        Progress/Outcome (Dressing Goal 1, OT)  goal ongoing  -TA            Activity Tolerance Goal 1 (OT)    Activity Tolerance Goal 1 (OT)  ADL, TA, TE  -TA        Activity Level (Endurance Goal 1, OT)  15 min activity;O2 sat >/ equal to 88%  -TA        Time Frame (Activity Tolerance Goal 1, OT)  by discharge  -TA        Progress/Outcome (Activity Tolerance Goal 1, OT)  goal ongoing  -TA           Living Environment    Home Accessibility  stairs to enter home  -TA          User Key  (r) = Recorded By, (t) = Taken By, (c) = Cosigned By    Initials Name Effective Dates    Johnathan Canela, BRENDAN 03/14/16 -          Occupational Therapy Education     Title: PT OT SLP Therapies (In Progress)     Topic: Occupational Therapy (In Progress)     Point: Precautions (Done)     Description: Instruct learner(s) on prescribed precautions during self-care and functional transfers.    Learning Progress Summary           Patient Acceptance, E, VU by TA at 6/2/2019  1:51 PM    Comment:  Body mechnaics with fxl transfers; reinforced need for call for assist with OOB activities.   Significant Other Acceptance, E, VU by TA at 6/2/2019  1:51 PM    Comment:  Body mechnaics with fxl transfers; reinforced need for call for assist with OOB activities.                   Point: Body mechanics (Done)     Description: Instruct learner(s) on proper positioning and spine alignment during self-care, functional mobility activities and/or exercises.    Learning Progress Summary           Patient  Acceptance, E, VU by TA at 6/2/2019  1:51 PM    Comment:  Body mechnaics with fxl transfers; reinforced need for call for assist with OOB activities.   Significant Other Acceptance, E, VU by TA at 6/2/2019  1:51 PM    Comment:  Body mechnaics with fxl transfers; reinforced need for call for assist with OOB activities.                               User Key     Initials Effective Dates Name Provider Type Discipline     03/14/16 -  Johnathan Almaraz, OT Occupational Therapist OT                  OT Recommendation and Plan  Outcome Summary/Treatment Plan (OT)  Anticipated Discharge Disposition (OT): home with assist  Planned Therapy Interventions (OT Eval): activity tolerance training, BADL retraining, functional balance retraining, occupation/activity based interventions, ROM/therapeutic exercise, strengthening exercise, transfer/mobility retraining  Therapy Frequency (OT Eval): daily(Per priority policy)  Plan of Care Review  Plan of Care Reviewed With: patient, spouse  Plan of Care Reviewed With: patient, spouse  Outcome Summary: Pt presents with generalized weakness, fxl decline from PLOF, deficits in ADL performance, fxl mobility, occupational endurance. Pt c/o dizziness upon standing, returned to sitting, BP stable. Will benefit from skilled OT services to address deficits, facilitate increased fxl I. Recommend home with assist at discharge.    Outcome Measures     Row Name 06/02/19 1315             How much help from another is currently needed...    Putting on and taking off regular lower body clothing?  3  -TA      Bathing (including washing, rinsing, and drying)  3  -TA      Toileting (which includes using toilet bed pan or urinal)  3  -TA      Putting on and taking off regular upper body clothing  4  -TA      Taking care of personal grooming (such as brushing teeth)  3  -TA      Eating meals  4  -TA      Score  20  -TA         Functional Assessment    Outcome Measure Options  AM-PAC 6 Clicks Daily  Activity (OT)  -TA        User Key  (r) = Recorded By, (t) = Taken By, (c) = Cosigned By    Initials Name Provider Type    Johnathan Canela OT Occupational Therapist          Time Calculation:   Time Calculation- OT     Row Name 06/02/19 1354             Time Calculation- OT    OT Start Time  1315 ttc 0 minutes  -TA      Total Timed Code Minutes- OT  0 minute(s)  -TA      OT Received On  06/02/19  -TA      OT Goal Re-Cert Due Date  06/12/19  -TA        User Key  (r) = Recorded By, (t) = Taken By, (c) = Cosigned By    Initials Name Provider Type    Johnathan Canela OT Occupational Therapist        Therapy Charges for Today     Code Description Service Date Service Provider Modifiers Qty    89338523160 HC OT EVAL MOD COMPLEXITY 4 6/2/2019 Johnathan Almaraz OT GO 1               Johnathan Almaraz OT  6/2/2019

## 2019-06-02 NOTE — PROGRESS NOTES
"GI Daily Progress Note  Subjective:    Chief Complaint: Melena and epigastric pain.    The patient's epigastric pain has improved.  He has had some nausea today, well controlled with antiemetic.  Denies vomiting.  He has not had a bowel movement since admission.  Continues to have orthostatic hypotension.    Objective:    BP 93/51   Pulse 68   Temp 98.2 °F (36.8 °C) (Oral)   Resp 18   Ht 182.9 cm (72\")   Wt 98.1 kg (216 lb 4.8 oz)   SpO2 98%   BMI 29.34 kg/m²     Physical Exam   Constitutional: He is oriented to person, place, and time. He appears well-developed and well-nourished. No distress.   HENT:   Head: Normocephalic.   Mouth/Throat: No oropharyngeal exudate.   Eyes: Conjunctivae are normal. No scleral icterus.   Neck: Normal range of motion.   Cardiovascular: Normal rate.   Pulmonary/Chest: Effort normal and breath sounds normal. No stridor. No respiratory distress. He has no wheezes. He has no rales.   Abdominal: Soft. Bowel sounds are normal. He exhibits no distension. There is no tenderness. There is no guarding.   Genitourinary:   Genitourinary Comments: Deferred   Musculoskeletal: Normal range of motion. He exhibits no edema.   Neurological: He is alert and oriented to person, place, and time.   Skin: Skin is warm and dry. Capillary refill takes less than 2 seconds. No rash noted.   Psychiatric: He has a normal mood and affect. His behavior is normal.   Nursing note and vitals reviewed.      Lab  Lab Results   Component Value Date    WBC 9.89 06/01/2019    HGB 7.6 (L) 06/02/2019    HGB 8.1 (L) 06/02/2019    HGB 8.6 (L) 06/01/2019    MCV 98.4 (H) 06/01/2019     06/01/2019    INR 1.02 01/29/2017       Lab Results   Component Value Date    GLUCOSE 110 (H) 06/02/2019    BUN 54 (H) 06/02/2019    CREATININE 0.88 06/02/2019    EGFRIFNONA 84 06/02/2019    BCR 61.4 (H) 06/02/2019    CO2 21.0 (L) 06/02/2019    CALCIUM 9.1 06/02/2019    ALBUMIN 4.00 06/01/2019    ALKPHOS 52 06/01/2019    BILITOT 0.5 " 06/01/2019    ALT 12 06/01/2019    AST 13 06/01/2019       Assessment:    Epigastric pain, improved.  Acute blood loss anemia.  Hemoglobin continues to drift downward.  Melena, improved.  No bowel movement in the past 24 hours.  Chronic NSAID use (aspirin).  Lactic acidosis, improving.    Plan:    >> Continue IV PPI.  >> EGD in a.m.  >> Awaiting echocardiogram results    Mark I. Brunner, MD  06/02/19  1:05 PM

## 2019-06-02 NOTE — PLAN OF CARE
Problem: Fall Risk (Adult)  Goal: Absence of Fall  Outcome: Ongoing (interventions implemented as appropriate)   06/02/19 0507   Fall Risk (Adult)   Absence of Fall making progress toward outcome       Problem: Patient Care Overview  Goal: Plan of Care Review  Outcome: Ongoing (interventions implemented as appropriate)   06/02/19 0507   Coping/Psychosocial   Plan of Care Reviewed With patient   OTHER   Outcome Summary VSS. Pain controlled with PRN meds. 1 unit blood given. Good UOP. Pt resting through the night. Will continue to monitor.   Plan of Care Review   Progress no change

## 2019-06-02 NOTE — THERAPY EVALUATION
Acute Care - Speech Language Pathology Initial Evaluation  [facility]     Patient Name: Salbador Apple  : 1940  MRN: [id number]  Today's Date: 2019               Admit Date: 2019     Visit Dx:    ICD-10-CM ICD-9-CM   1. Gastrointestinal hemorrhage with melena K92.1 578.1   2. Syncope and collapse R55 780.2   3. NSAID long-term use Z79.1 V58.64     Patient Active Problem List   Diagnosis   • Essential hypertension   • PVC's (premature ventricular contractions)   • Cardiomyopathy, ischemic   • Chronic systolic congestive heart failure (CMS/HCC)   • Bilateral hearing loss   • LUCIA (obstructive sleep apnea)   • Beta-blocker intolerance   • Gastrointestinal hemorrhage with melena   • Lactic acidosis   • CVA (cerebrovascular accident) (CMS/HCC)   • Fall   • Weakness   • Dysarthria   • Diverticular disease     Past Medical History:   Diagnosis Date   • Arthritis    • Back pain    • CHF (congestive heart failure) (CMS/HCC)    • Hearing difficulty of both ears    • Hypertension    • Neck pain    • PVC (premature ventricular contraction)      Past Surgical History:   Procedure Laterality Date   • CARDIAC ABLATION  2017    PVC ABLATION PER DR. PETERSON   • CARDIAC CATHETERIZATION  09/15/2016    PER DR. GUZMAN   • CARDIAC CATHETERIZATION N/A 9/15/2016    Procedure: Left Heart Cath;  Surgeon: Lonnie Guzman MD;  Location:  [facility] CATH INVASIVE LOCATION;  Service:    • CARDIAC ELECTROPHYSIOLOGY PROCEDURE N/A 2017    Procedure: EPS+/- ablation for PVC's;  Surgeon: Pj Peterson MD;  Location:  [facility] EP INVASIVE LOCATION;  Service:    • PROSTATE SURGERY          SLP EVALUATION (last 72 hours)      SLP SLC Evaluation     Row Name 19 1000                   Communication Assessment/Intervention    Document Type  evaluation  -AW        Subjective Information  no complaints  -AW        Patient Observations  alert;cooperative  -AW        Patient/Family Observations  no family present   -AW        Patient Effort  excellent  -AW        Symptoms Noted During/After Treatment  none  -AW           General Information    Patient Profile Reviewed  yes  -AW        Pertinent History Of Current Problem  GI hemorrhage, h/o stroke, speech impairment  -AW        Precautions/Limitations, Vision  WFL  -AW        Precautions/Limitations, Hearing  hearing impairment, bilaterally  -AW        Prior Level of Function-Communication  motor speech impairment  -AW        Plans/Goals Discussed with  patient  -AW        Barriers to Rehab  previous functional deficit  -AW        Patient's Goals for Discharge  patient did not state  -AW           Pain Assessment    Additional Documentation  Pain Scale: Numbers Pre/Post-Treatment (Group)  -AW           Pain Scale: Numbers Pre/Post-Treatment    Pain Scale: Numbers, Pretreatment  0/10 - no pain  -AW        Pain Scale: Numbers, Post-Treatment  0/10 - no pain  -AW           Comprehension Assessment/Intervention    Comprehension Assessment/Intervention  Auditory Comprehension  -AW           Auditory Comprehension Assessment/Intervention    Auditory Comprehension (Communication)  WFL  -AW           Expression Assessment/Intervention    Expression Assessment/Intervention  verbal expression  -AW           Verbal Expression Assessment/Intervention    Verbal Expression  WFL  -AW        Conversational Discourse/Fluency  WFL  -AW           Oral Motor Structure and Function    Oral Motor Structure and Function  mild impairment  -AW        Dentition Assessment  edentulous, does not have dentures  -AW           Motor Speech Assessment/Intervention    Motor Speech Function  mild impairment  -AW        Characteristics Consistent with Dysarthria  slow rate;decreased articulation  -AW           Cognitive Assessment Intervention- SLP    Cognitive Function (Cognition)  WFL  -AW        Orientation Status (Cognition)  awareness of basic personal information;person;place;time;situation;WFL  -AW         Memory (Cognitive)  WFL  -AW        Attention (Cognitive)  WFL  -AW           SLP Clinical Impressions    SLP Diagnosis  mild dysarthria  -AW        Rehab Potential/Prognosis  other (see comments) pt feels baseline, but he said wife thought worse now  -AW        SLC Criteria for Skilled Therapy Interventions Met  yes  -AW        Functional Impact  functional impact in social situations  -AW           Recommendations    Therapy Frequency (SLP SLC)  PRN  -AW           Communication Treatment Objective and Progress Goals (SLP)    Motor Speech/Dysarthria Treatment Objectives  Motor Speech/Dysarthria Treatment Objectives (Group)  -AW           Motor Speech/Dysarthria Treatment Objectives    Articulation Selection  articulation, SLP goal 1  -AW           Articulation Goal 1 (SLP)    Improve Articulation Goal 1 (SLP)  by over-articulating in connected speech;independently (over 90% accuracy);100%  -AW        Time Frame (Articulation Goal 1, SLP)  short term goal (STG);by discharge  -AW          User Key  (r) = Recorded By, (t) = Taken By, (c) = Cosigned By    Initials Name Effective Dates    AW Carolin Maki, MS CCC-SLP 06/22/15 -              EDUCATION  The patient has been educated in the following areas:     Communication Impairment.    SLP Recommendation and Plan  SLP Diagnosis: mild dysarthria     Swallow Criteria for Skilled Therapeutic Interventions Met: no problems identified which require skilled intervention  SLC Criteria for Skilled Therapy Interventions Met: yes  SLP Diagnosis: mild dysarthria     Therapy Frequency (Swallow): evaluation only       Plan of Care Reviewed With: patient  Plan of Care Review  Plan of Care Reviewed With: patient  Progress: improving      SLP GOALS     Row Name 06/02/19 1000             Articulation Goal 1 (SLP)    Improve Articulation Goal 1 (SLP)  by over-articulating in connected speech;independently (over 90% accuracy);100%  -AW      Time Frame (Articulation Goal 1, SLP)   short term goal (STG);by discharge  -        User Key  (r) = Recorded By, (t) = Taken By, (c) = Cosigned By    Initials Name Provider Type    Carolin Edwards MS CCC-SLP Speech and Language Pathologist                  Time Calculation:     Time Calculation- SLP     Row Name 19 1022             Time Calculation- SLP    SLP Start Time  0900  -AW      SLP Received On  19  -        User Key  (r) = Recorded By, (t) = Taken By, (c) = Cosigned By    Initials Name Provider Type    Carolin Edwards MS CCC-SLP Speech and Language Pathologist          Therapy Charges for Today     Code Description Service Date Service Provider Modifiers Qty    49637107125 HC ST EVAL SPEECH AND PROD W LANG  2 2019 Carolni Maki MS CCC-SLP GN 1    62440399670 HC ST EVAL ORAL PHARYNG SWALLOW 2 2019 Carolin Maki MS CCC-SLP GN 1                     [facility]  2019 and Acute Care - Speech Language Pathology   Swallow Initial Evaluation [facility]     Patient Name: Salbador pAple  : 1940  MRN: [id number]  Today's Date: 2019               Admit Date: 2019    Visit Dx:     ICD-10-CM ICD-9-CM   1. Gastrointestinal hemorrhage with melena K92.1 578.1   2. Syncope and collapse R55 780.2   3. NSAID long-term use Z79.1 V58.64     Patient Active Problem List   Diagnosis   • Essential hypertension   • PVC's (premature ventricular contractions)   • Cardiomyopathy, ischemic   • Chronic systolic congestive heart failure (CMS/HCC)   • Bilateral hearing loss   • LUCIA (obstructive sleep apnea)   • Beta-blocker intolerance   • Gastrointestinal hemorrhage with melena   • Lactic acidosis   • CVA (cerebrovascular accident) (CMS/[facility])   • Fall   • Weakness   • Dysarthria   • Diverticular disease     Past Medical History:   Diagnosis Date   • Arthritis    • Back pain    • CHF (congestive heart failure) (CMS/[facility])    • Hearing difficulty of both ears    • Hypertension    • Neck pain    •  PVC (premature ventricular contraction)      Past Surgical History:   Procedure Laterality Date   • CARDIAC ABLATION  01/30/2017    PVC ABLATION PER DR. MCINTOSH   • CARDIAC CATHETERIZATION  09/15/2016    PER DR. WAKEFIELD   • CARDIAC CATHETERIZATION N/A 9/15/2016    Procedure: Left Heart Cath;  Surgeon: Lonnie Wakefield MD;  Location: [facility] CATH INVASIVE LOCATION;  Service:    • CARDIAC ELECTROPHYSIOLOGY PROCEDURE N/A 1/30/2017    Procedure: EPS+/- ablation for PVC's;  Surgeon: Pj Mcintosh MD;  Location: [facility] EP INVASIVE LOCATION;  Service:    • PROSTATE SURGERY          SWALLOW EVALUATION (last 72 hours)      SLP Adult Swallow Evaluation     Row Name 06/02/19 1000                   Oral Musculature and Cranial Nerve Assessment    Oral Motor General Assessment  generalized oral motor weakness  -AW           General Eating/Swallowing Observations    Eating/Swallowing Skills  self-fed  -AW        Positioning During Eating  upright 90 degree  -AW        Utensils Used  straw  -AW        Consistencies Trialed  thin liquids  -AW           Clinical Swallow Eval    Oral Prep Phase  WFL  -AW        Oral Transit  WFL  -AW        Oral Residue  WFL  -AW        Pharyngeal Phase  WFL  -AW        Esophageal Phase  unremarkable  -AW        Clinical Swallow Evaluation Summary  Pt put on clear liquid diet. Observed only with water - no s/s aspiration. Pt reports no difficulty with solids.   -AW           Clinical Impression    SLP Swallowing Diagnosis  functional oral phase;functional pharyngeal phase  -AW        Functional Impact  no impact on function  -AW        Swallow Criteria for Skilled Therapeutic Interventions Met  no problems identified which require skilled intervention  -AW           Recommendations    Therapy Frequency (Swallow)  evaluation only  -AW        SLP Diet Recommendation  regular textures;thin liquids  -AW        SLP Rec. for Method of Medication Administration  meds whole;with thin liquids  -AW           User Key  (r) = Recorded By, (t) = Taken By, (c) = Cosigned By    Initials Name Effective Dates    Carolin Edwards MS CCC-SLP 06/22/15 -           EDUCATION  The patient has been educated in the following areas:   Dysphagia (Swallowing Impairment).    SLP Recommendation and Plan  SLP Swallowing Diagnosis: functional oral phase, functional pharyngeal phase  SLP Diet Recommendation: regular textures, thin liquids     SLP Rec. for Method of Medication Administration: meds whole, with thin liquids           Swallow Criteria for Skilled Therapeutic Interventions Met: no problems identified which require skilled intervention        Therapy Frequency (Swallow): evaluation only          Plan of Care Reviewed With: patient  Plan of Care Review  Plan of Care Reviewed With: patient  Progress: improving    SLP GOALS     Row Name 06/02/19 1000             Articulation Goal 1 (SLP)    Improve Articulation Goal 1 (SLP)  by over-articulating in connected speech;independently (over 90% accuracy);100%  -AW      Time Frame (Articulation Goal 1, SLP)  short term goal (STG);by discharge  -AW        User Key  (r) = Recorded By, (t) = Taken By, (c) = Cosigned By    Initials Name Provider Type    Carolin Edwards MS CCC-SLP Speech and Language Pathologist             Time Calculation:   Time Calculation- SLP     Row Name 06/02/19 1022             Time Calculation- SLP    SLP Start Time  0900  -AW      SLP Received On  06/02/19  -AW        User Key  (r) = Recorded By, (t) = Taken By, (c) = Cosigned By    Initials Name Provider Type    Carolin Edwards MS CCC-SLP Speech and Language Pathologist          Therapy Charges for Today     Code Description Service Date Service Provider Modifiers Qty    33217528396 HC ST EVAL SPEECH AND PROD W LANG  2 6/2/2019 Carolin Maki MS CCC-SLP GN 1    [id number]  ST EVAL ORAL PHARYNG SWALLOW 2 6/2/2019 Carolin Maki MS CCC-SLP GN 1               Carolin Maki MS  CCC-SLP  6/2/2019

## 2019-06-02 NOTE — PLAN OF CARE
Problem: Patient Care Overview  Goal: Plan of Care Review  Outcome: Ongoing (interventions implemented as appropriate)   06/02/19 1021   Coping/Psychosocial   Plan of Care Reviewed With patient   Plan of Care Review   Progress improving   SLP evaluation completed. Will address dysarthria if does not fully resolve - SLP will check back. Please see note for further details and recommendations.

## 2019-06-02 NOTE — PLAN OF CARE
Problem: Patient Care Overview  Goal: Plan of Care Review  Outcome: Ongoing (interventions implemented as appropriate)   06/02/19 2682   Coping/Psychosocial   Plan of Care Reviewed With patient;spouse   OTHER   Outcome Summary Pt presents with generalized weakness, fxl decline from PLOF, deficits in ADL performance, fxl mobility, occupational endurance. Pt c/o dizziness upon standing, returned to sitting, BP stable. Will benefit from skilled OT services to address deficits, facilitate increased fxl I. Recommend home with assist at discharge.

## 2019-06-02 NOTE — PROGRESS NOTES
Discharge Planning Assessment  [facility]     Patient Name: Salbador Apple  MRN: [id number]  Today's Date: 6/2/2019    Admit Date: 6/1/2019    Discharge Needs Assessment     Row Name 06/02/19 1335       Living Environment    Lives With  spouse    Current Living Arrangements  home/apartment/condo    Primary Care Provided by  self    Provides Primary Care For  no one    Family Caregiver if Needed  spouse    Quality of Family Relationships  supportive;involved;helpful    Able to Return to Prior Arrangements  yes       Resource/Environmental Concerns    Transportation Concerns  car, none       Transition Planning    Patient/Family Anticipates Transition to  home with family    Patient/Family Anticipated Services at Transition      Transportation Anticipated  family or friend will provide       Discharge Needs Assessment    Readmission Within the Last 30 Days  no previous admission in last 30 days    Concerns to be Addressed  discharge planning    Equipment Currently Used at Home  none    Anticipated Changes Related to Illness  none    Equipment Needed After Discharge  none        Discharge Plan     Row Name 06/02/19 6652       Plan    Plan  HOME    Patient/Family in Agreement with Plan  yes    Plan Comments  Met with pt at bedside.  He resides with his wife in [facility].  He is very independent at baseline, however, has indicated that he has had some progressive weakness over the last 3 days.  No current DME or HH services.  Confirmed he has [employer] Medicare with Rx coverage.  CM briefly discussed possible STR, however, pt declined.  He indicated that he gets ample activity at home.  He may consider OP PT if necessary.  No immediate needs identified/voiced.  CM will cont to follow.    Final Discharge Disposition Code  01 - home or self-care        Destination      No service coordination in this encounter.      Durable Medical Equipment      No service coordination in this encounter.       Dialysis/Infusion      No service coordination in this encounter.      Home Medical Care      No service coordination in this encounter.      Therapy      No service coordination in this encounter.      Community Resources      No service coordination in this encounter.          Demographic Summary     Row Name 06/02/19 1334       General Information    Admission Type  inpatient    Referral Source  admission list    Reason for Consult  discharge planning    Preferred Language  English       Contact Information    Permission Granted to Share Info With      Contact Information Obtained for          Functional Status     Row Name 06/02/19 1334       Functional Status    Usual Activity Tolerance  good       Functional Status, IADL    Medications  independent    Meal Preparation  independent    Housekeeping  independent    Laundry  independent    Shopping  independent        Psychosocial    No documentation.       Abuse/Neglect    No documentation.       Legal    No documentation.       Substance Abuse    No documentation.       Patient Forms    No documentation.           Alysa Mcdonald

## 2019-06-02 NOTE — THERAPY EVALUATION
Acute Care - Physical Therapy Initial Evaluation  [facility]     Patient Name: Salbador Apple  : 1940  MRN: [id number]  Today's Date: 2019   Onset of Illness/Injury or Date of Surgery: 19  Date of Referral to PT: 19  Referring Physician: Dr Denis      Admit Date: 2019    Visit Dx:     ICD-10-CM ICD-9-CM   1. Gastrointestinal hemorrhage with melena K92.1 578.1   2. Syncope and collapse R55 780.2   3. NSAID long-term use Z79.1 V58.64   4. Impaired functional mobility, balance, gait, and endurance Z74.09 V49.89     Patient Active Problem List   Diagnosis   • Essential hypertension   • PVC's (premature ventricular contractions)   • Cardiomyopathy, ischemic   • Chronic systolic congestive heart failure (CMS/HCC)   • Bilateral hearing loss   • LUCIA (obstructive sleep apnea)   • Beta-blocker intolerance   • Gastrointestinal hemorrhage with melena   • Lactic acidosis   • CVA (cerebrovascular accident) (CMS/HCC)   • Fall   • Weakness   • Dysarthria   • Diverticular disease     Past Medical History:   Diagnosis Date   • Arthritis    • Back pain    • CHF (congestive heart failure) (CMS/HCC)    • Hearing difficulty of both ears    • Hypertension    • Neck pain    • PVC (premature ventricular contraction)      Past Surgical History:   Procedure Laterality Date   • CARDIAC ABLATION  2017    PVC ABLATION PER DR. PETERSON   • CARDIAC CATHETERIZATION  09/15/2016    PER DR. GUZMAN   • CARDIAC CATHETERIZATION N/A 9/15/2016    Procedure: Left Heart Cath;  Surgeon: Lonnie Guzman MD;  Location:  [facility];  Service:    • CARDIAC ELECTROPHYSIOLOGY PROCEDURE N/A 2017    Procedure: EPS+/- ablation for PVC's;  Surgeon: Pj Peterson MD;  Location:  [facility];  Service:    • PROSTATE SURGERY          PT ASSESSMENT (last 12 hours)      Physical Therapy Evaluation     Row Name 19 1001          PT Evaluation Time/Intention    Subjective  Information  complains of;weakness;fatigue  -MB     Document Type  evaluation  -MB     Mode of Treatment  physical therapy  -MB     Patient Effort  excellent  -MB     Symptoms Noted During/After Treatment  dizziness  -MB     Comment  Pt. c/o dizziness upon sitting EOB; VSS.   -MB     Row Name 06/02/19 1001          General Information    Patient Profile Reviewed?  yes  -MB     Onset of Illness/Injury or Date of Surgery  06/01/19  -MB     Referring Physician  ELIZABETH Denis MD  -MB     Patient Observations  alert;cooperative;agree to therapy  -MB     Patient/Family Observations  No family present at bedside.  -MB     General Observations of Patient  Pt. supine, on RA, IV heplocked.  RN consent for PT.    -MB     Prior Level of Function  independent:;all household mobility;community mobility;gait;transfer;bed mobility;ADL's;driving;using stairs  -MB     Equipment Currently Used at Home  none  -MB     Pertinent History of Current Functional Problem  Pt. is a 77 yo male who presented to ED w/ melena, generalized weakness, s/p fall at home, and reported dysarthria by wife.  CT head: small, old R thalamic infarct and small, old R cerebellar infarct.  Pt. adm w/ GI hemorrhage w/ melena.   -MB     Existing Precautions/Restrictions  fall  -MB     Limitations/Impairments  hearing Pt. [place] IRA.  -MB     Risks Reviewed  patient:;LOB;nausea/vomiting;dizziness;increased discomfort;change in vital signs  -MB     Benefits Reviewed  patient:;improve function;increase independence;increase strength;increase balance;decrease pain;decrease risk of DVT;improve skin integrity;increase knowledge  -MB     Barriers to Rehab  none identified  -MB     Row Name 06/02/19 1001          Relationship/Environment    Primary Source of Support/Comfort  spouse  -MB     Lives With  spouse  -MB     Row Name 06/02/19 1001          Resource/Environmental Concerns    Current Living Arrangements  home/apartment/condo  -MB     Row Name 06/02/19 1001           Home Main Entrance    Number of Stairs, Main Entrance  one  -MB     Row Name 06/02/19 1001          Cognitive Assessment/Intervention- PT/OT    Orientation Status (Cognition)  oriented x 3  -MB     Follows Commands (Cognition)  follows one step commands;over 90% accuracy;verbal cues/prompting required  -MB     Safety Deficit (Cognitive)  mild deficit;insight into deficits/self awareness;safety precautions awareness  -MB     Row Name 06/02/19 1001          Safety Issues, Functional Mobility    Safety Issues Affecting Function (Mobility)  awareness of need for assistance;insight into deficits/self awareness;safety precaution awareness  -MB     Impairments Affecting Function (Mobility)  balance;coordination;endurance/activity tolerance;strength  -MB     Row Name 06/02/19 1001          Bed Mobility Assessment/Treatment    Bed Mobility Assessment/Treatment  supine-sit  -MB     Supine-Sit [place] (Bed Mobility)  conditional independence  -MB     Comment (Bed Mobility)  Pt. advanced to EOB independently; c/o dizziness upon sitting.  BP taken; VSS.  Symptoms resolved in 1 min.   -MB     Row Name 06/02/19 1001          Transfer Assessment/Treatment    Transfer Assessment/Treatment  sit-stand transfer;stand-sit transfer  -MB     Comment (Transfers)  Pt. required VCs for safe hand placement and safety (to pause upon standing to ensure VSS.)   -MB     Sit-Stand [place] (Transfers)  contact guard;verbal cues  -MB     Stand-Sit [place] (Transfers)  contact guard;verbal cues  -[facility] Name 06/02/19 1001          Sit-Stand Transfer    Assistive Device (Sit-Stand Transfers)  other (see comments) gait belt, no AD (pt. declined RW)  -MB     Row Name 06/02/19 1001          Stand-Sit Transfer    Assistive Device (Stand-Sit Transfers)  other (see comments) gait belt  -[facility] Name 06/02/19 1001          Gait/Stairs Assessment/Training    [place] Level (Gait)  contact guard;verbal cues  -MB     Assistive Device  (Gait)  other (see comments) gait belt; declined RW  -MB     Distance in Feet (Gait)  15  -MB     Pattern (Gait)  step-through  -MB     Deviations/Abnormal Patterns (Gait)  patricia decreased;stride length decreased  -MB     Bilateral Gait Deviations  forward flexed posture  -MB     Comment (Gait/Stairs)  Pt. demo step through gait pattern w/ increased forward flexion.  VCs for increased B step length and upright posture.  Gait distance limited by fatigue.   -MB     Row Name 06/02/19 1001          General ROM    GENERAL ROM COMMENTS  BLE AROM WFL.  -MB     Row Name 06/02/19 1001          MMT (Manual Muscle Testing)    General MMT Comments  BLE grossly 4+/5.   -MB     Row Name 06/02/19 1001          Motor Assessment/Intervention    Additional Documentation  Balance (Group);Therapeutic Exercise (Group)  -MB     Row Name 06/02/19 1001          Therapeutic Exercise    Therapeutic Exercise  seated, lower extremities  -MB     Additional Documentation  Therapeutic Exercise (Row)  -MB     Row Name 06/02/19 1001          Lower Extremity Seated Therapeutic Exercise    Performed, Seated Lower Extremity (Therapeutic Exercise)  hip flexion/extension;hip abduction/adduction;LAQ (long arc quad), knee extension;ankle dorsiflexion/plantarflexion  -MB     Exercise Type, Seated Lower Extremity (Therapeutic Exercise)  AROM (active range of motion)  -MB     Sets/Reps Detail, Seated Lower Extremity (Therapeutic Exercise)  1/15, BLEs  -MB     Row Name 06/02/19 1001          Balance    Balance  static standing balance;dynamic standing balance  -MB     Row Name 06/02/19 1001          Static Standing Balance    Level of [place] (Supported Standing, Static Balance)  supervision  -MB     Time Able to Maintain Position (Supported Standing, Static Balance)  1 to 2 minutes  -MB     Assistive Device Utilized (Supported Standing, Static Balance)  other (see comments) no AD  -MB     Row Name 06/02/19 1001          Dynamic Standing Balance     Level of [place], Reaches Outside Midline (Standing, Dynamic Balance)  contact guard assist  -MB     Time Able to Maintain Position, Reaches Outside Midline (Standing, Dynamic Balance)  1 to 2 minutes  -MB     Assistive Device Utilized (Supported Standing, Dynamic Balance)  other (see comments) no AD  -MB     Row Name 06/02/19 1001          Sensory Assessment/Intervention    Sensory General Assessment  no sensation deficits identified  -MB     Row Name 06/02/19 1001          Pain Scale: Numbers Pre/Post-Treatment    Pain Scale: Numbers, Pretreatment  0/10 - no pain  -MB     Pain Scale: Numbers, Post-Treatment  0/10 - no pain  -MB     Row Name 06/02/19 1001          Plan of Care Review    Plan of Care Reviewed With  patient  -MB     Row Name 06/02/19 1001          Physical Therapy Clinical Impression    Date of Referral to PT  06/01/19  -MB     PT Diagnosis (PT Clinical Impression)  Functional decline  -MB     Functional Level at Time of Evaluation (PT Clinical Impression)  CGA for safe mobility  -MB     Patient/Family Goals Statement (PT Clinical Impression)  Return to home and PLOF  -MB     Criteria for Skilled Interventions Met (PT Clinical Impression)  yes;treatment indicated  -MB     Rehab Potential (PT Clinical Summary)  good, to achieve stated therapy goals  -MB     Care Plan Review (PT)  evaluation/treatment results reviewed;care plan/treatment goals reviewed;risks/benefits reviewed;current/potential barriers reviewed;patient/other agree to care plan  -MB     Row Name 06/02/19 1001          Vital Signs    Pre Systolic BP Rehab  117  -MB     Pre Treatment Diastolic BP  68  -MB     Intra Systolic BP Rehab  130  -MB     Intra Treatment Diastolic BP  72  -MB     Pre SpO2 (%)  98  -MB     O2 Delivery Pre Treatment  room air  -MB     Intra SpO2 (%)  95  -MB     O2 Delivery Intra Treatment  room air  -MB     Post SpO2 (%)  98  -MB     O2 Delivery Post Treatment  room air  -MB     Pre Patient Position  Supine   -MB     Intra Patient Position  Standing  -MB     Post Patient Position  Sitting  -MB     Row Name 06/02/19 1001          Physical Therapy Goals    Bed Mobility Goal Selection (PT)  bed mobility, PT goal 1  -MB     Transfer Goal Selection (PT)  transfer, PT goal 1  -MB     Gait Training Goal Selection (PT)  gait training, PT goal 1  -MB     Stairs Goal Selection (PT)  stairs, PT goal 1  -MB     Additional Documentation  Stairs Goal Selection (PT) (Row)  -MB     Row Name 06/02/19 1001          Bed Mobility Goal 1 (PT)    Activity/Assistive Device (Bed Mobility Goal 1, PT)  bed mobility activities, all  -MB     [place] Level/Cues Needed (Bed Mobility Goal 1, PT)  independent  -MB     Time Frame (Bed Mobility Goal 1, PT)  1 week  -MB     Progress/Outcomes (Bed Mobility Goal 1, PT)  goal ongoing  -MB     Row Name 06/02/19 1001          Transfer Goal 1 (PT)    Activity/Assistive Device (Transfer Goal 1, PT)  sit-to-stand/stand-to-sit;bed-to-chair/chair-to-bed  -MB     [place] Level/Cues Needed (Transfer Goal 1, PT)  independent  -MB     Time Frame (Transfer Goal 1, PT)  1 week  -MB     Progress/Outcome (Transfer Goal 1, PT)  goal ongoing  -MB     Row Name 06/02/19 1001          Gait Training Goal 1 (PT)    Activity/Assistive Device (Gait Training Goal 1, PT)  gait (walking locomotion)  -MB     [place] Level (Gait Training Goal 1, PT)  independent  -MB     Distance (Gait Goal 1, PT)  500  -MB     Time Frame (Gait Training Goal 1, PT)  2 weeks  -MB     Progress/Outcome (Gait Training Goal 1, PT)  goal ongoing  -MB     Row Name 06/02/19 1001          Stairs Goal 1 (PT)    Activity/Assistive Device (Stairs Goal 1, PT)  stairs, all skills  -MB     [place] Level/Cues Needed (Stairs Goal 1, PT)  supervision required  -MB     Number of Stairs (Stairs Goal 1, PT)  1  -MB     Time Frame (Stairs Goal 1, PT)  2 weeks  -MB     Progress/Outcome (Stairs Goal 1, PT)  goal ongoing  -MB     Row Name 06/02/19 1001           Patient Education Goal (PT)    Activity (Patient Education Goal, PT)  HEP  -MB     [place]/Cues/Accuracy (Memory Goal 2, PT)  demonstrates adequately;verbalizes understanding  -MB     Time Frame (Patient Education Goal, PT)  1 week  -MB     Progress/Outcome (Patient Education Goal, PT)  goal ongoing  -MB     Row Name 06/02/19 1001          Positioning and Restraints    Pre-Treatment Position  in bed  -MB     Post Treatment Position  chair  -MB     In Chair  notified nsg;reclined;call light within reach;encouraged to call for assist;exit alarm on;legs elevated  -MB     Row Name 06/02/19 1001          Living Environment    Home Accessibility  stairs to enter home  -MB       User Key  (r) = Recorded By, (t) = Taken By, (c) = Cosigned By    Initials Name Provider Type    Neyda Falcon, PT Physical Therapist        Physical Therapy Education     Title: PT OT SLP Therapies (In Progress)     Topic: Physical Therapy (Done)     Point: Mobility training (Done)     Learning Progress Summary           Patient Acceptance, E,D, VU,NR by MB at 6/2/2019  2:02 PM                   Point: Home exercise program (Done)     Learning Progress Summary           Patient Acceptance, E,D, VU,NR by MB at 6/2/2019  2:02 PM                   Point: Body mechanics (Done)     Learning Progress Summary           Patient Acceptance, E,D, VU,NR by MB at 6/2/2019  2:02 PM                   Point: Precautions (Done)     Learning Progress Summary           Patient Acceptance, E,D, VU,NR by MB at 6/2/2019  2:02 PM                               User Key     Initials Effective Dates Name Provider Type Discipline    MB 03/14/16 -  Neyda Blevins, PT Physical Therapist PT              PT Recommendation and Plan  Anticipated Discharge Disposition (PT): home with assist, home with OP services  Planned Therapy Interventions (PT Eval): balance training, bed mobility training, gait training, home exercise program, patient/family education,  stair training, strengthening, transfer training  Therapy Frequency (PT Clinical Impression): daily  Outcome Summary/Treatment Plan (PT)  Anticipated Equipment Needs at Discharge (PT): (TBD)  Anticipated Discharge Disposition (PT): home with assist, home with OP services  Plan of Care Reviewed With: patient  Progress: improving  Outcome Summary: PT eval completed.  Pt. presents w/ generalized weakness, mild balance deficits with h/o recent fall, and functional decline.  Pt. performed bed mobility independently, STS transfers w/ CGA, and ambulated 15 ft w/ CGA; gait distance limited by fatigue.  Skilled IPPT indicated to address impairments and promote return to PLOF.  Recommend home w/ assist and OPPT at D/C to further improve balance and gait safety.  Outcome Measures     Row Name 06/02/19 1315 06/02/19 0910          How much help from another person do you currently need...    Turning from your back to your side while in flat bed without using bedrails?  --  4  -MB     Moving from lying on back to sitting on the side of a flat bed without bedrails?  --  4  -MB     Moving to and from a bed to a chair (including a wheelchair)?  --  3  -MB     Standing up from a chair using your arms (e.g., wheelchair, bedside chair)?  --  3  -MB     Climbing 3-5 steps with a railing?  --  3  -MB     To walk in hospital room?  --  3  -MB     AM-PAC 6 Clicks Score  --  20  -MB        How much help from another is currently needed...    Putting on and taking off regular lower body clothing?  3  -TA  --     Bathing (including washing, rinsing, and drying)  3  -TA  --     Toileting (which includes using toilet bed pan or urinal)  3  -TA  --     Putting on and taking off regular upper body clothing  4  -TA  --     Taking care of personal grooming (such as brushing teeth)  3  -TA  --     Eating meals  4  -TA  --     Score  20  -TA  --        Functional Assessment    Outcome Measure Options  AM-PAC 6 Clicks Daily Activity (OT)  -TA  AM-PAC  6 Clicks Basic Mobility (PT)  -MB       User Key  (r) = Recorded By, (t) = Taken By, (c) = Cosigned By    Initials Name Provider Type    TA Johnathan Almaraz, OT Occupational Therapist    Neyda Falcon, PT Physical Therapist         Time Calculation:   PT Charges     Row Name 06/02/19 1406             Time Calculation    Start Time  0910  -MB      PT Received On  06/02/19  -MB      PT Goal Re-Cert Due Date  06/12/19  -MB        User Key  (r) = Recorded By, (t) = Taken By, (c) = Cosigned By    Initials Name Provider Type    Neyda Falcon, PT Physical Therapist        Therapy Charges for Today     Code Description Service Date Service Provider Modifiers Qty    82995862820 HC PT EVAL MOD COMPLEXITY 4 6/2/2019 Neyda Blevins, PT GP 1          PT G-Codes  Outcome Measure Options: AM-PAC 6 Clicks Daily Activity (OT)  AM-PAC 6 Clicks Score: 20  Score: 20      Neyda Blevins, PT  6/2/2019

## 2019-06-02 NOTE — CONSULTS
Inpatient Neurology Consult Stroke  Consult performed by: Jayme Mabry MD  Consult ordered by: Esthela Denis MD          Patient Care Team:  Kumar Rodriguez MD as PCP - General (Family Medicine)  Lonnie Guzman MD as Consulting Physician (Cardiology)    Chief complaint:  Slurred speech    Subjective     History of Present Illness    78 y.o. male referred by Dr Denis for stroke sx.  Present to [facility] 6/1/19 with weakness, dizziness, nausea, melenic stools.  Pt fell and hit his head on night prior to arrival.      Pt arose to go to [facility] night prior to arrival felt weak and fell.  No LOC but did strike back of head.  Wife reported speech slurred and numbness in both legs.      Pt taking NSAIDS above recommended amounts.  Anemic and tx 1 U PRBC Hgb 9.8    MRI Brain, my review of films, scattered small vessel disease, no acute infarct, old right cerebellar and thalamic lacunes     Review of Systems   Constitutional: Negative for activity change, fatigue and unexpected weight change.   HENT: Negative for facial swelling, hearing loss, tinnitus, trouble swallowing and voice change.    Eyes: Negative for photophobia, pain and visual disturbance.   Respiratory: Negative for apnea, cough and choking.    Cardiovascular: Negative for chest pain.   Gastrointestinal: Negative for constipation, nausea and vomiting.   Endocrine: Negative for cold intolerance.   Genitourinary: Negative for difficulty urinating, frequency and urgency.   Musculoskeletal: Positive for arthralgias, back pain, gait problem, neck pain and neck stiffness. Negative for myalgias.   Skin: Negative for rash.   Allergic/Immunologic: Negative for immunocompromised state.   Neurological: Positive for dizziness and weakness. Negative for tremors, seizures, syncope, facial asymmetry, speech difficulty, light-headedness, numbness and headaches.   Hematological: Negative for adenopathy.   Psychiatric/Behavioral: Negative for  confusion, decreased concentration, hallucinations and sleep disturbance. The patient is not nervous/anxious.         Past Medical History:   Diagnosis Date   • Arthritis    • Back pain    • CHF (congestive heart failure) (CMS/HCC)    • Hearing difficulty of both ears    • Hypertension    • Neck pain    • PVC (premature ventricular contraction)    ,   Past Surgical History:   Procedure Laterality Date   • CARDIAC ABLATION  01/30/2017    PVC ABLATION PER DR. MCINTOSH   • CARDIAC CATHETERIZATION  09/15/2016    PER DR. WAKEFIELD   • CARDIAC CATHETERIZATION N/A 9/15/2016    Procedure: Left Heart Cath;  Surgeon: Lonnie Wakefield MD;  Location:  [facility];  Service:    • CARDIAC ELECTROPHYSIOLOGY PROCEDURE N/A 1/30/2017    Procedure: EPS+/- ablation for PVC's;  Surgeon: Pj Mcintosh MD;  Location:  [facility] EP INVASIVE LOCATION;  Service:    • PROSTATE SURGERY     , History reviewed. No pertinent family history.,   Social History     Tobacco Use   • Smoking status: Never Smoker   • Smokeless tobacco: Never Used   Substance Use Topics   • Alcohol use: No   • Drug use: No   ,   Medications Prior to Admission   Medication Sig Dispense Refill Last Dose   • amLODIPine (NORVASC) 5 MG tablet Take 5 mg by mouth Every Morning.   5/31/2019 at Unknown time   • aspirin  MG tablet Take 650 mg by mouth Every Night.   5/31/2019 at Unknown time   • HYDROcodone-acetaminophen (NORCO) 7.5-325 MG per tablet Take 1 tablet by mouth Every 6 (Six) Hours As Needed for Moderate Pain .   5/31/2019 at Unknown time   • metoprolol succinate XL (TOPROL-XL) 50 MG 24 hr tablet Take 50 mg by mouth Every Morning.   6/1/2019 at Unknown time   • multivitamins-minerals (PRESERVISION AREDS 2) capsule capsule Take 1 capsule by mouth 2 (Two) Times a Day.   5/31/2019 at Unknown time   • sacubitril-valsartan (ENTRESTO) 24-26 MG tablet Take 1 tablet by mouth 2 (Two) Times a Day.   5/31/2019 at Unknown time   • spironolactone  (ALDACTONE) 25 MG tablet Take 25 mg by mouth Every Morning.   6/1/2019 at Unknown time   , Scheduled Meds:    amLODIPine 5 mg Oral QAM   atorvastatin 40 mg Oral Nightly   metoprolol succinate XL 50 mg Oral QAM   pantoprazole 40 mg Intravenous BID AC   sacubitril-valsartan 1 tablet Oral QAM   sodium chloride 3 mL Intravenous Q12H   spironolactone 25 mg Oral QAM   , Continuous Infusions:   , PRN Meds:  HYDROcodone-acetaminophen  •  sodium chloride  •  sodium chloride and Allergies:  Patient has no known allergies.    Objective      Vital Signs  Temp:  [98 °F (36.7 °C)-98.3 °F (36.8 °C)] 98.2 °F (36.8 °C)  Heart Rate:  [61-86] 61  Resp:  [18] 18  BP: (113-150)/(66-85) 117/68    Physical Exam   Constitutional: He is oriented to person, place, and time. Vital signs are normal. He appears well-developed and well-nourished.   HENT:   Head: Normocephalic and atraumatic.   Eyes: EOM and lids are normal. Pupils are equal, round, and reactive to light.   Fundoscopic exam:       The right eye shows no exudate, no hemorrhage and no papilledema. The right eye shows venous pulsations.        The left eye shows no exudate, no hemorrhage and no papilledema. The left eye shows venous pulsations.   Neck: Normal range of motion and phonation normal. Normal carotid pulses present. Carotid bruit is not present. No thyroid mass and no thyromegaly present.   Cardiovascular: Normal rate, regular rhythm and normal heart sounds.   Pulmonary/Chest: Effort normal.   Neurological: He is oriented to person, place, and time. He has normal strength. He has a normal Finger-Nose-Finger Test, a normal Heel to Shin Test, a normal Romberg Test and a normal Tandem Gait Test. Gait normal.   Skin: Skin is warm and dry.   Psychiatric: He has a normal mood and affect. His speech is normal.   Nursing note and vitals reviewed.    Neurologic Exam     Mental Status   Oriented to person, place, and time.   Registration: recalls 3 of 3 objects. Recall at 5  minutes: recalls 3 of 3 objects. Follows 3 step commands.   Attention: normal. Concentration: normal.   Speech: speech is normal   Level of consciousness: alert  Knowledge: good and consistent with education.   Able to name object. Able to read. Able to repeat. Able to write. Normal comprehension.     Cranial Nerves     CN II   Visual fields full to confrontation.   Visual acuity: normal  Right visual field deficit: none  Left visual field deficit: none     CN III, IV, VI   Pupils are equal, round, and reactive to light.  Extraocular motions are normal.   Right pupil: Shape: regular. Reactivity: brisk. Consensual response: intact.   Left pupil: Shape: regular. Reactivity: brisk. Consensual response: intact.   Nystagmus: none   Diplopia: none  Ophthalmoparesis: none  Upgaze: normal  Downgaze: normal  Conjugate gaze: present  Vestibulo-ocular reflex: present    CN V   Facial sensation intact.   Right corneal reflex: normal  Left corneal reflex: normal    CN VII   Right facial weakness: none  Left facial weakness: none    CN VIII   Hearing: intact    CN IX, X   Palate: symmetric  Right gag reflex: normal  Left gag reflex: normal    CN XI   Right sternocleidomastoid strength: normal  Left sternocleidomastoid strength: normal    CN XII   Tongue: not atrophic  Fasciculations: absent  Tongue deviation: none    Motor Exam   Muscle bulk: normal  Overall muscle tone: normal  Right arm tone: normal  Left arm tone: normal  Right leg tone: normal  Left leg tone: normal    Strength   Strength 5/5 throughout.     Sensory Exam   Light touch normal.   Vibration normal.   Proprioception normal.   Pinprick normal.     Gait, Coordination, and Reflexes     Gait  Gait: normal    Coordination   Romberg: negative  Finger to nose coordination: normal  Heel to shin coordination: normal  Tandem walking coordination: normal    Tremor   Resting tremor: absent  Intention tremor: absent  Action tremor: absent    Reflexes   Reflexes 2+ except as  noted.     Results Review:    I reviewed the patient's new clinical results.  I reviewed the patient's new imaging results and agree with the interpretation.  I reviewed the patient's other test results and agree with the interpretation    Echo and Carotid pending       Assessment/Plan       Gastrointestinal hemorrhage with melena    Essential hypertension    Cardiomyopathy, ischemic    Chronic systolic congestive heart failure (CMS/HCC)    Bilateral hearing loss    LUCIA (obstructive sleep apnea)    Lactic acidosis    CVA (cerebrovascular accident) (CMS/HCC)    Fall    Weakness    Dysarthria    Diverticular disease    1.  Falls - secondary to anemia and LH, suspect orthostatic hypotension.  No evidence of acute stroke.  Echo and carotid pending.        Jayme Mabry MD  06/02/19  11:50 AM

## 2019-06-02 NOTE — PLAN OF CARE
Problem: Patient Care Overview  Goal: Plan of Care Review  Outcome: Ongoing (interventions implemented as appropriate)   06/02/19 6683   Coping/Psychosocial   Plan of Care Reviewed With patient   OTHER   Outcome Summary PT eval completed. Pt. presents w/ generalized weakness, mild balance deficits with h/o recent fall, and functional decline. Pt. performed bed mobility independently, STS transfers w/ CGA, and ambulated 15 ft w/ CGA; gait distance limited by fatigue. Skilled IPPT indicated to address impairments and promote return to PLOF. Recommend home w/ assist and OPPT at D/C to further improve balance and gait safety.   Plan of Care Review   Progress improving

## 2019-06-02 NOTE — PLAN OF CARE
Problem: Cardiac: Heart Failure (Adult)  Intervention: Promote Functional Ability   06/02/19 1214   Activity   Activity Assistance Provided assistance, 1 person;independent;assistance, stand-by   Daily Care Interventions   Self-Care Promotion meal setup provided;independence encouraged     Intervention: Provide Oxygenation/Ventilation/Perfusion Support   06/02/19 1214   Activity   Activity Management activity adjusted per tolerance;activity encouraged   Positioning   Head of Bed (HOB) HOB at 30-45 degrees   Respiratory Interventions   Airway/Ventilation Management airway patency maintained     Intervention: Support Psychosocial Response to Heart Failure   06/02/19 1214   Coping/Psychosocial Interventions   Supportive Measures active listening utilized;self-care encouraged;verbalization of feelings encouraged   Psychosocial Support   Family/Support System Care caregiver stress acknowledged;involvement promoted     Intervention: Monitor/Manage Nutrition Support   06/02/19 1214   Adjust Diet to Limit Bowel Elimination   Nutrition Interventions meal setup provided     Intervention: Gradually Correct Positive Fluid Balance   06/02/19 1214   Positioning   Body Position foot of bed elevated;weight shift assistance provided   Nutrition Interventions   Fluid/Electrolyte Management fluids adjusted   Skin Interventions   Skin Protection adhesive use limited;pulse oximeter probe site changed;tubing/devices free from skin contact;protective footwear used     Intervention: Prevent/Manage DVT/VTE Risk   06/02/19 1214   Interventions   VTE Prevention/Management sequential compression devices on     Intervention: Monitor/Manage Cardiac Rhythm Disturbance   06/02/19 1214   Safety Interventions   Stabilization Measures blood products administered;legs elevated       Goal: Signs and Symptoms of Listed Potential Problems Will be Absent, Minimized or Managed (Cardiac: Heart Failure)  Outcome: Ongoing (interventions implemented as  appropriate)   06/02/19 1214   Goal/Outcome Evaluation   Problems Assessed (Heart Failure) all   Problems Present (Heart Failure) functional decline/self-care deficit;respiratory compromise;situational response;cardiac pump dysfunction

## 2019-06-03 ENCOUNTER — ANESTHESIA EVENT (OUTPATIENT)
Dept: GASTROENTEROLOGY | Facility: HOSPITAL | Age: 79
End: 2019-06-03

## 2019-06-03 ENCOUNTER — ANESTHESIA (OUTPATIENT)
Dept: GASTROENTEROLOGY | Facility: HOSPITAL | Age: 79
End: 2019-06-03

## 2019-06-03 LAB
HCT VFR BLD AUTO: 21.5 % (ref 37.5–51)
HCT VFR BLD AUTO: 22.7 % (ref 37.5–51)
HCT VFR BLD AUTO: 23.8 % (ref 37.5–51)
HCT VFR BLD AUTO: 25.2 % (ref 37.5–51)
HGB BLD-MCNC: 6.9 G/DL (ref 13–17.7)
HGB BLD-MCNC: 7.4 G/DL (ref 13–17.7)
HGB BLD-MCNC: 7.8 G/DL (ref 13–17.7)
HGB BLD-MCNC: 8 G/DL (ref 13–17.7)

## 2019-06-03 PROCEDURE — 85018 HEMOGLOBIN: CPT | Performed by: HOSPITALIST

## 2019-06-03 PROCEDURE — 99233 SBSQ HOSP IP/OBS HIGH 50: CPT | Performed by: INTERNAL MEDICINE

## 2019-06-03 PROCEDURE — 97116 GAIT TRAINING THERAPY: CPT

## 2019-06-03 PROCEDURE — 85014 HEMATOCRIT: CPT | Performed by: HOSPITALIST

## 2019-06-03 PROCEDURE — 99232 SBSQ HOSP IP/OBS MODERATE 35: CPT | Performed by: PSYCHIATRY & NEUROLOGY

## 2019-06-03 PROCEDURE — 86900 BLOOD TYPING SEROLOGIC ABO: CPT

## 2019-06-03 PROCEDURE — 88305 TISSUE EXAM BY PATHOLOGIST: CPT | Performed by: INTERNAL MEDICINE

## 2019-06-03 PROCEDURE — P9016 RBC LEUKOCYTES REDUCED: HCPCS

## 2019-06-03 PROCEDURE — 25010000002 PROPOFOL 10 MG/ML EMULSION: Performed by: NURSE ANESTHETIST, CERTIFIED REGISTERED

## 2019-06-03 PROCEDURE — 36430 TRANSFUSION BLD/BLD COMPNT: CPT

## 2019-06-03 PROCEDURE — 0DB68ZX EXCISION OF STOMACH, VIA NATURAL OR ARTIFICIAL OPENING ENDOSCOPIC, DIAGNOSTIC: ICD-10-PCS | Performed by: INTERNAL MEDICINE

## 2019-06-03 PROCEDURE — 97110 THERAPEUTIC EXERCISES: CPT

## 2019-06-03 RX ORDER — SODIUM CHLORIDE 9 MG/ML
INJECTION, SOLUTION INTRAVENOUS CONTINUOUS PRN
Status: DISCONTINUED | OUTPATIENT
Start: 2019-06-03 | End: 2019-06-03 | Stop reason: SURG

## 2019-06-03 RX ORDER — PROPOFOL 10 MG/ML
VIAL (ML) INTRAVENOUS AS NEEDED
Status: DISCONTINUED | OUTPATIENT
Start: 2019-06-03 | End: 2019-06-03 | Stop reason: SURG

## 2019-06-03 RX ORDER — LIDOCAINE HYDROCHLORIDE 10 MG/ML
INJECTION, SOLUTION EPIDURAL; INFILTRATION; INTRACAUDAL; PERINEURAL AS NEEDED
Status: DISCONTINUED | OUTPATIENT
Start: 2019-06-03 | End: 2019-06-03 | Stop reason: SURG

## 2019-06-03 RX ORDER — FENTANYL CITRATE 50 UG/ML
50 INJECTION, SOLUTION INTRAMUSCULAR; INTRAVENOUS
Status: DISCONTINUED | OUTPATIENT
Start: 2019-06-03 | End: 2019-06-03 | Stop reason: HOSPADM

## 2019-06-03 RX ORDER — ONDANSETRON 2 MG/ML
4 INJECTION INTRAMUSCULAR; INTRAVENOUS ONCE AS NEEDED
Status: DISCONTINUED | OUTPATIENT
Start: 2019-06-03 | End: 2019-06-03 | Stop reason: HOSPADM

## 2019-06-03 RX ORDER — EPHEDRINE SULFATE 50 MG/ML
INJECTION, SOLUTION INTRAVENOUS AS NEEDED
Status: DISCONTINUED | OUTPATIENT
Start: 2019-06-03 | End: 2019-06-03 | Stop reason: SURG

## 2019-06-03 RX ORDER — FAMOTIDINE 20 MG/1
20 TABLET, FILM COATED ORAL ONCE
Status: CANCELLED | OUTPATIENT
Start: 2019-06-03 | End: 2019-06-03

## 2019-06-03 RX ORDER — SODIUM CHLORIDE, SODIUM LACTATE, POTASSIUM CHLORIDE, CALCIUM CHLORIDE 600; 310; 30; 20 MG/100ML; MG/100ML; MG/100ML; MG/100ML
9 INJECTION, SOLUTION INTRAVENOUS CONTINUOUS
Status: DISCONTINUED | OUTPATIENT
Start: 2019-06-03 | End: 2019-06-04 | Stop reason: HOSPADM

## 2019-06-03 RX ORDER — FAMOTIDINE 10 MG/ML
20 INJECTION, SOLUTION INTRAVENOUS 2 TIMES DAILY
Status: DISCONTINUED | OUTPATIENT
Start: 2019-06-03 | End: 2019-06-03 | Stop reason: HOSPADM

## 2019-06-03 RX ORDER — SODIUM CHLORIDE 0.9 % (FLUSH) 0.9 %
3 SYRINGE (ML) INJECTION EVERY 12 HOURS SCHEDULED
Status: DISCONTINUED | OUTPATIENT
Start: 2019-06-03 | End: 2019-06-03 | Stop reason: HOSPADM

## 2019-06-03 RX ORDER — FAMOTIDINE 10 MG/ML
20 INJECTION, SOLUTION INTRAVENOUS ONCE
Status: CANCELLED | OUTPATIENT
Start: 2019-06-03 | End: 2019-06-03

## 2019-06-03 RX ORDER — SODIUM CHLORIDE 0.9 % (FLUSH) 0.9 %
3-10 SYRINGE (ML) INJECTION AS NEEDED
Status: DISCONTINUED | OUTPATIENT
Start: 2019-06-03 | End: 2019-06-03 | Stop reason: HOSPADM

## 2019-06-03 RX ORDER — LIDOCAINE HYDROCHLORIDE 10 MG/ML
0.5 INJECTION, SOLUTION EPIDURAL; INFILTRATION; INTRACAUDAL; PERINEURAL ONCE AS NEEDED
Status: DISCONTINUED | OUTPATIENT
Start: 2019-06-03 | End: 2019-06-03 | Stop reason: HOSPADM

## 2019-06-03 RX ADMIN — HYDROCODONE BITARTRATE AND ACETAMINOPHEN 1 TABLET: 7.5; 325 TABLET ORAL at 05:37

## 2019-06-03 RX ADMIN — PROPOFOL 100 MG: 10 INJECTION, EMULSION INTRAVENOUS at 08:39

## 2019-06-03 RX ADMIN — FAMOTIDINE 20 MG: 10 INJECTION, SOLUTION INTRAVENOUS at 07:35

## 2019-06-03 RX ADMIN — HYDROCODONE BITARTRATE AND ACETAMINOPHEN 1 TABLET: 7.5; 325 TABLET ORAL at 17:47

## 2019-06-03 RX ADMIN — ATORVASTATIN CALCIUM 40 MG: 40 TABLET, FILM COATED ORAL at 20:36

## 2019-06-03 RX ADMIN — METOPROLOL SUCCINATE 50 MG: 50 TABLET, EXTENDED RELEASE ORAL at 05:37

## 2019-06-03 RX ADMIN — SODIUM CHLORIDE, PRESERVATIVE FREE 3 ML: 5 INJECTION INTRAVENOUS at 20:37

## 2019-06-03 RX ADMIN — PANTOPRAZOLE SODIUM 40 MG: 40 INJECTION, POWDER, FOR SOLUTION INTRAVENOUS at 09:41

## 2019-06-03 RX ADMIN — PROPOFOL 20 MG: 10 INJECTION, EMULSION INTRAVENOUS at 08:46

## 2019-06-03 RX ADMIN — SACUBITRIL AND VALSARTAN 1 TABLET: 24; 26 TABLET, FILM COATED ORAL at 05:37

## 2019-06-03 RX ADMIN — SODIUM CHLORIDE, POTASSIUM CHLORIDE, SODIUM LACTATE AND CALCIUM CHLORIDE 9 ML/HR: 600; 310; 30; 20 INJECTION, SOLUTION INTRAVENOUS at 07:35

## 2019-06-03 RX ADMIN — HYDROCODONE BITARTRATE AND ACETAMINOPHEN 1 TABLET: 7.5; 325 TABLET ORAL at 10:49

## 2019-06-03 RX ADMIN — SPIRONOLACTONE 25 MG: 25 TABLET ORAL at 05:36

## 2019-06-03 RX ADMIN — EPHEDRINE SULFATE 10 MG: 50 INJECTION INTRAMUSCULAR; INTRAVENOUS; SUBCUTANEOUS at 08:44

## 2019-06-03 RX ADMIN — AMLODIPINE BESYLATE 5 MG: 5 TABLET ORAL at 05:37

## 2019-06-03 RX ADMIN — PROPOFOL 20 MG: 10 INJECTION, EMULSION INTRAVENOUS at 08:52

## 2019-06-03 RX ADMIN — LIDOCAINE HYDROCHLORIDE 50 MG: 10 INJECTION, SOLUTION EPIDURAL; INFILTRATION; INTRACAUDAL; PERINEURAL at 08:39

## 2019-06-03 RX ADMIN — PANTOPRAZOLE SODIUM 40 MG: 40 INJECTION, POWDER, FOR SOLUTION INTRAVENOUS at 17:43

## 2019-06-03 RX ADMIN — SODIUM CHLORIDE: 9 INJECTION, SOLUTION INTRAVENOUS at 08:33

## 2019-06-03 NOTE — PROGRESS NOTES
"Neurology       Patient Care Team:  Kumar Rodriguez MD as PCP - General (Family Medicine)  Lonnie Guzman MD as Consulting Physician (Cardiology)    Chief complaint slurred speech, gen weak      Subjective .     History:  Speech is at baseline. Feels weak all over, had not been able to go on walks with his wife for a week before admission, wondering if due to blood loss. No lateralized weakness    ROS: no fever, cp    Objective     Vital Signs   Blood pressure 138/65, pulse 61, temperature 98.5 °F (36.9 °C), temperature source Oral, resp. rate 18, height 182.9 cm (72\"), weight 98.9 kg (218 lb), SpO2 97 %.    Physical Exam:              Neuro: wd elderly wm in nad, alert, fluent, appropriate, no dysarthria  eomi face symm tml  Motor: maew against gravity    Results Review:              Echo unremarkable, carotids 0-49% bilaterally   MRI brain images reviewed, agree no acute abnormality, no ischemia  Endoscopy reviewed, mild erosive gastritis, oozing duodenal ulceration    Assessment/Plan     Transient dysarthria and falls -- likely 2/2 anemia/gen weakness, no evidence of stroke/tia.  Off aspirin.  Discussed prognosis.  Will sign off. Call if needed.     I discussed the patients findings and my recommendations with patient and family    Alysa Kenyon MD  06/03/19  10:27 AM      "

## 2019-06-03 NOTE — BRIEF OP NOTE
"ESOPHAGOGASTRODUODENOSCOPY  Progress Note    Salbador Fleming Anaidkarlo  6/1/2019 - 6/3/2019    EGD shows mild erosive gastritis. 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 The stricture is non-obstructing and traversed with scope.    >> Stop NSAIDs (ASA)  >> Await H pylori biopsy    Mark I. Brunner, MD     Date: 6/3/2019  Time: 8:54 AM      "

## 2019-06-03 NOTE — ANESTHESIA PREPROCEDURE EVALUATION
Anesthesia Evaluation                  Airway   Mallampati: I  TM distance: >3 FB  Neck ROM: full  No difficulty expected  Dental - normal exam   (+) lower dentures and upper dentures    Pulmonary - normal exam   (+) sleep apnea,   Cardiovascular - normal exam    (+) hypertension, CHF,     ROS comment: Normal EF, mild MR    Neuro/Psych  (+) CVA,     GI/Hepatic/Renal/Endo    (+)  GI bleeding (HCT 21),     Musculoskeletal     Abdominal  - normal exam    Bowel sounds: normal.   Substance History      OB/GYN          Other   (+) arthritis                     Anesthesia Plan    ASA 3     general   (Propofol    Transfuse 1 unit pRBC)  intravenous induction   Anesthetic plan, all risks, benefits, and alternatives have been provided, discussed and informed consent has been obtained with: patient.    Plan discussed with CRNA.

## 2019-06-03 NOTE — SIGNIFICANT NOTE
06/03/19 1146   SLP Deferred Reason   SLP Deferred Reason Routine  (Spoke with pt and wife - speech is at baseline. No further f/u indicated. )

## 2019-06-03 NOTE — THERAPY TREATMENT NOTE
Acute Care - Physical Therapy Treatment Note  [facility]     Patient Name: Salbador Apple  : 1940  MRN: [id number]  Today's Date: 6/3/2019  Onset of Illness/Injury or Date of Surgery: 19  Date of Referral to PT: 19  Referring Physician: Dr Denis    Admit Date: 2019    Visit Dx:    ICD-10-CM ICD-9-CM   1. Gastrointestinal hemorrhage with melena K92.1 578.1   2. Syncope and collapse R55 780.2   3. NSAID long-term use Z79.1 V58.64   4. Impaired functional mobility, balance, gait, and endurance Z74.09 V49.89     Patient Active Problem List   Diagnosis   • Essential hypertension   • PVC's (premature ventricular contractions)   • Cardiomyopathy, ischemic   • Chronic systolic congestive heart failure (CMS/HCC)   • Bilateral hearing loss   • LUCIA (obstructive sleep apnea)   • Beta-blocker intolerance   • Gastrointestinal hemorrhage with melena   • Lactic acidosis   • CVA (cerebrovascular accident) (CMS/HCC)   • Fall   • Weakness   • Dysarthria   • Diverticular disease   • NSAID long-term use       Therapy Treatment    Rehabilitation Treatment Summary     Row Name 19 1605             Treatment Time/Intention    Discipline  physical therapist  -LF      Document Type  therapy note (daily note)  -LF      Subjective Information  complains of;pain;weakness  -LF      Mode of Treatment  physical therapy;individual therapy  -LF      Patient/Family Observations  Pt sitting up in bed when PT arrived.  No family/visitors present.  -LF      Care Plan Review  care plan/treatment goals reviewed;risks/benefits reviewed;current/potential barriers reviewed;patient/other agree to care plan  -LF      Therapy Frequency (PT Clinical Impression)  daily  -LF      Patient Effort  excellent  -LF      Comment  Pt reported dizziness initially when standing, but felt better after walking for a few minutes.  -LF      Existing Precautions/Restrictions  fall  -LF      Recorded by [LF] Kelle Lemus, PT  06/03/19 1648      Row Name 06/03/19 1605             Vital Signs    Pre Systolic BP Rehab  129  -LF      Pre Treatment Diastolic BP  62  -LF      Post Systolic BP Rehab  138  -LF      Post Treatment Diastolic BP  81  -LF      Pretreatment Heart Rate (beats/min)  54  -LF      Posttreatment Heart Rate (beats/min)  70  -LF      Pre SpO2 (%)  93  -LF      O2 Delivery Pre Treatment  room air  -LF      O2 Delivery Intra Treatment  room air  -LF      Post SpO2 (%)  95  -LF      O2 Delivery Post Treatment  room air  -LF      Pre Patient Position  Supine  -LF      Intra Patient Position  Standing  -LF      Post Patient Position  Supine  -LF      Rest Breaks   1  -LF      Recorded by [LF] Kelle Lemus, PT 06/03/19 1648      Row Name 06/03/19 1605             Cognitive Assessment/Intervention    Additional Documentation  Cognitive Assessment/Intervention (Group)  -LF      Recorded by [LF] Kelle Lemus, PT 06/03/19 1648      Row Name 06/03/19 1609             Cognitive Assessment/Intervention- PT/OT    Affect/Mental Status (Cognitive)  WFL  -LF      Orientation Status (Cognition)  oriented x 4  -LF      Follows Commands (Cognition)  WFL;follows one step commands;over 90% accuracy  -LF      Cognitive Function (Cognitive)  safety deficit  -LF      Safety Deficit (Cognitive)  mild deficit;at risk behavior observed;awareness of need for assistance;insight into deficits/self awareness;safety precautions awareness  -LF      Recorded by [LF] Kelle Lemus, PT 06/03/19 1648      Row Name 06/03/19 1605             Bed Mobility Assessment/Treatment    Bed Mobility Assessment/Treatment  supine-sit;sit-supine  -LF      Supine-Sit [place] (Bed Mobility)  conditional independence  -LF      Sit-Supine [place] (Bed Mobility)  conditional independence  -LF      Assistive Device (Bed Mobility)  head of bed elevated  -LF      Recorded by [LF] Kelle Lemus, PT 06/03/19 1648      Row Name 06/03/19 1608              Transfer Assessment/Treatment    Transfer Assessment/Treatment  sit-stand transfer;stand-sit transfer  -LF      Recorded by [LF] Kelle Lemus, PT 06/03/19 1648      Row Name 06/03/19 1605             Sit-Stand Transfer    Sit-Stand [place] (Transfers)  contact guard;1 person assist;verbal cues;nonverbal cues (demo/gesture)  -LF      Assistive Device (Sit-Stand Transfers)  walker, front-wheeled  -LF      Recorded by [LF] Kelle Lemus, PT 06/03/19 1648      Row Name 06/03/19 1605             Stand-Sit Transfer    Stand-Sit [place] (Transfers)  contact guard;1 person assist;verbal cues;nonverbal cues (demo/gesture)  -LF      Assistive Device (Stand-Sit Transfers)  walker, front-wheeled  -LF      Recorded by [LF] Kelle Lemus, PT 06/03/19 1648      Row Name 06/03/19 1605             Gait/Stairs Assessment/Training    94815 - Gait Training Minutes   13  -LF      Gait/Stairs Assessment/Training  gait/ambulation independence;gait/ambulation assistive device;distance ambulated;gait pattern;gait deviations  -LF      [place] Level (Gait)  contact guard;1 person assist  -LF      Assistive Device (Gait)  walker, front-wheeled  -LF      Distance in Feet (Gait)  200  -LF      Pattern (Gait)  step-through  -LF      Deviations/Abnormal Patterns (Gait)  base of support, wide;patricia decreased;stride length decreased  -LF      Bilateral Gait Deviations  forward flexed posture;heel strike decreased;weight shift ability decreased  -LF      Comment (Gait/Stairs)  Forward flexed posture when amb.  Pt c/o dizziness initially, but was able to continue walking after breif standing rest break.  -LF      Recorded by [LF] Kelle Lemus, PT 06/03/19 1648      Row Name 06/03/19 1605             Motor Skills Assessment/Interventions    Additional Documentation  Balance (Group)  -LF      Recorded by [LF] Kelle Lemus, PT 06/03/19 1648      Row Name 06/03/19 1605             Therapeutic Exercise    Therapeutic  Exercise  seated, lower extremities;supine, lower extremities  -LF      Recorded by [LF] Kelle Lemus, PT 06/03/19 1648      Row Name 06/03/19 1605             Lower Extremity Seated Therapeutic Exercise    Performed, Seated Lower Extremity (Therapeutic Exercise)  hip flexion/extension;hip abduction/adduction;knee flexion/extension;ankle dorsiflexion/plantarflexion;LAQ (long arc quad), knee extension  -LF      Exercise Type, Seated Lower Extremity (Therapeutic Exercise)  AROM (active range of motion)  -LF      Sets/Reps Detail, Seated Lower Extremity (Therapeutic Exercise)  10-20 reps each renetta  -LF      Recorded by [LF] Kelle Lemus, PT 06/03/19 1648      Row Name 06/03/19 1605             Lower Extremity Supine Therapeutic Exercise    Performed, Supine Lower Extremity (Therapeutic Exercise)  hip flexion/extension;hip abduction/adduction;knee flexion/extension;ankle dorsiflexion/plantarflexion;SLR (straight leg raise);quadriceps sets;hamstring sets;gluteal sets;ankle pumps  -LF      Exercise Type, Supine Lower Extremity (Therapeutic Exercise)  AROM (active range of motion);isometric contraction, static  -LF      Sets/Reps Detail, Supine Lower Extremity (Therapeutic Exercise)  10-15 reps each renetta  -LF      Recorded by [LF] Kelle Lemus, PT 06/03/19 1648      Row Name 06/03/19 1605             Balance    Balance  static sitting balance;static standing balance;dynamic sitting balance;dynamic standing balance  -LF      Recorded by [LF] Kelle Lemus, PT 06/03/19 1648      Row Name 06/03/19 1605             Static Sitting Balance    Level of [place] (Unsupported Sitting, Static Balance)  conditional independence  -LF      Sitting Position (Unsupported Sitting, Static Balance)  sitting on edge of bed  -LF      Time Able to Maintain Position (Unsupported Sitting, Static Balance)  4 to 5 minutes  -LF      Recorded by [LF] Kelle Lemus, PT 06/03/19 1648      Row Name 06/03/19 1605              Dynamic Sitting Balance    Level of [place], Reaches Outside Midline (Sitting, Dynamic Balance)  supervision;1 person assist  -LF      Sitting Position, Reaches Outside Midline (Sitting, Dynamic Balance)  sitting on edge of bed  -LF      Recorded by [LF] Kelle Lemus, PT 06/03/19 1648      Row Name 06/03/19 1605             Static Standing Balance    Level of [place] (Supported Standing, Static Balance)  supervision;1 person assist  -LF      Time Able to Maintain Position (Supported Standing, Static Balance)  2 to 3 minutes  -LF      Assistive Device Utilized (Supported Standing, Static Balance)  walker, rolling  -LF      Level of [place] (Unsupported Standing, Static Balance)  contact guard assist;1 person assist  -LF      Time Able to Maintain Position (Unsupported Standing, Static Balance)  1 to 2 minutes  -LF      Recorded by [LF] Kelle Lemus, PT 06/03/19 1648      Row Name 06/03/19 1605             Dynamic Standing Balance    Level of [place], Reaches Outside Midline (Standing, Dynamic Balance)  contact guard assist;1 person assist  -LF      Time Able to Maintain Position, Reaches Outside Midline (Standing, Dynamic Balance)  1 to 2 minutes  -LF      Assistive Device Utilized (Supported Standing, Dynamic Balance)  walker, rolling  -LF      Recorded by [LF] Kelle Lemus, PT 06/03/19 1648      Row Name 06/03/19 1605             Positioning and Restraints    Pre-Treatment Position  in bed  -LF      Post Treatment Position  bed  -LF      In Bed  fowlers;notified nsg;call light within reach;encouraged to call for assist;side rails up x2  -LF      Recorded by [LF] Kelle Lemus, PT 06/03/19 1648      Row Name 06/03/19 1605             Pain Scale: Numbers Pre/Post-Treatment    Pain Scale: Numbers, Pretreatment  5/10  -LF      Pain Scale: Numbers, Post-Treatment  5/10  -LF      Pre/Post Treatment Pain Comment  back pain  -LF      Pain Intervention(s)   Repositioned;Ambulation/increased activity  -LF      Recorded by [LF] Kelle Lemus, PT 06/03/19 1648      Row Name 06/03/19 1605             Coping    Observed Emotional State  calm;cooperative;hopeful;pleasant  -LF      Verbalized Emotional State  acceptance  -LF      Recorded by [LF] Kelle Lemus, PT 06/03/19 1648      Row Name 06/03/19 1605             Plan of Care Review    Plan of Care Reviewed With  patient  -LF      Recorded by [LF] Kelle Lemus, PT 06/03/19 1648      Row Name 06/03/19 1605             Outcome Summary/Treatment Plan (PT)    Daily Summary of Progress (PT)  progress toward functional goals as expected  -LF      Barriers to Overall Progress (PT)  basdeline back pain, [place]  -LF      Plan for Continued Treatment (PT)  Continue per PT POC  -LF      Anticipated Equipment Needs at Discharge (PT)  front wheeled walker  -LF      Anticipated Discharge Disposition (PT)  home with assist;home with OP services  -LF      Recorded by [LF] Kelle Lemsu, PT 06/03/19 1648        User Key  (r) = Recorded By, (t) = Taken By, (c) = Cosigned By    Initials Name Effective Dates Discipline    LF Kelle Lemus, PT 06/08/18 -  PT                   Physical Therapy Education     Title: PT OT SLP Therapies (In Progress)     Topic: Physical Therapy (Done)     Point: Mobility training (Done)     Learning Progress Summary           Patient Acceptance, E,D, VU,DU,NR by  at 6/3/2019  4:05 PM    Acceptance, E,D, VU,NR by MB at 6/2/2019  2:02 PM                   Point: Home exercise program (Done)     Learning Progress Summary           Patient Acceptance, E,D, VU,DU,NR by  at 6/3/2019  4:05 PM    Acceptance, E,D, VU,NR by MB at 6/2/2019  2:02 PM                   Point: Body mechanics (Done)     Learning Progress Summary           Patient Acceptance, E,D, VU,DU,NR by  at 6/3/2019  4:05 PM    Acceptance, E,D, VU,NR by MB at 6/2/2019  2:02 PM                   Point: Precautions (Done)     Learning  Progress Summary           Patient Acceptance, E,D, VU,DU,NR by  at 6/3/2019  4:05 PM    Acceptance, E,D, VU,NR by MB at 6/2/2019  2:02 PM                               User Key     Initials Effective Dates Name Provider Type Discipline    MB 03/14/16 -  Neyda Blevins, PT Physical Therapist PT     06/08/18 -  Kelle Lemus, PT Physical Therapist PT                PT Recommendation and Plan  Anticipated Discharge Disposition (PT): home with assist, home with OP services  Therapy Frequency (PT Clinical Impression): daily  Outcome Summary/Treatment Plan (PT)  Daily Summary of Progress (PT): progress toward functional goals as expected  Barriers to Overall Progress (PT): basdeline back pain, [place]  Plan for Continued Treatment (PT): Continue per PT POC  Anticipated Equipment Needs at Discharge (PT): front wheeled walker  Anticipated Discharge Disposition (PT): home with assist, home with OP services  Plan of Care Reviewed With: patient  Outcome Summary: Pt participated in gt training and LE strengthening exercises with PT.  Pt ambulated 200ft with RW, forward flexed posture, and [id number].  PT reported dizziness initially when amb, but was able to continue walking after standing rest break.  Recommend continued inpatient PT and D/C home with family assist PRN and outpatient PT.  Outcome Measures     Row Name 06/03/19 1605 06/02/19 1315 06/02/19 0910       How much help from another person do you currently need...    Turning from your back to your side while in flat bed without using bedrails?  4  -LF  --  4  -MB    Moving from lying on back to sitting on the side of a flat bed without bedrails?  4  -LF  --  4  -MB    Moving to and from a bed to a chair (including a wheelchair)?  3  -LF  --  3  -MB    Standing up from a chair using your arms (e.g., wheelchair, bedside chair)?  3  -LF  --  3  -MB    Climbing 3-5 steps with a railing?  3  -LF  --  3  -MB    To walk in hospital room?  3  -LF  --  3  -MB     AM-PAC 6 Clicks Score  20  -LF  --  20  -MB       How much help from another is currently needed...    Putting on and taking off regular lower body clothing?  --  3  -TA  --    Bathing (including washing, rinsing, and drying)  --  3  -TA  --    Toileting (which includes using toilet bed pan or urinal)  --  3  -TA  --    Putting on and taking off regular upper body clothing  --  4  -TA  --    Taking care of personal grooming (such as brushing teeth)  --  3  -TA  --    Eating meals  --  4  -TA  --    Score  --  20  -TA  --       Functional Assessment    Outcome Measure Options  AM-PAC 6 Clicks Basic Mobility (PT)  -LF  AM-PAC 6 Clicks Daily Activity (OT)  -TA  AM-PAC 6 Clicks Basic Mobility (PT)  -MB      User Key  (r) = Recorded By, (t) = Taken By, (c) = Cosigned By    Initials Name Provider Type    TA Johnathan Almaraz, OT Occupational Therapist    MB Neyda Blevins, PT Physical Therapist    LF Kelle Lemus, PT Physical Therapist         Time Calculation:   PT Charges     Row Name 06/03/19 1654 06/03/19 1605          Time Calculation    Start Time  --  1605  -LF     PT Received On  --  06/03/19  -LF     PT Goal Re-Cert Due Date  --  06/12/19  -LF        Timed Charges    24777 - PT Therapeutic Exercise Minutes  10  -LF  --     95730 - Gait Training Minutes   --  13  -LF       User Key  (r) = Recorded By, (t) = Taken By, (c) = Cosigned By    Initials Name Provider Type     Kelle Lemus, PT Physical Therapist        Therapy Charges for Today     Code Description Service Date Service Provider Modifiers Qty    96906138683 HC GAIT TRAINING EA 15 MIN 6/3/2019 Kelle Lemus, PT GP 1    60866654962 HC PT THER PROC EA 15 MIN 6/3/2019 Kelle Lemus, PT GP 1    27347623459 HC GAIT TRAINING EA 15 MIN 6/3/2019 Kelle Lemus, PT GP 1          PT G-Codes  Outcome Measure Options: AM-PAC 6 Clicks Basic Mobility (PT)  AM-PAC 6 Clicks Score: 20  Score: 20    Kelle Lemus PT  6/3/2019

## 2019-06-03 NOTE — PLAN OF CARE
Problem: Fall Risk (Adult)  Goal: Absence of Fall  Outcome: Ongoing (interventions implemented as appropriate)   06/03/19 0429   Fall Risk (Adult)   Absence of Fall making progress toward outcome

## 2019-06-03 NOTE — ANESTHESIA POSTPROCEDURE EVALUATION
Patient: Salbador Apple    Procedure Summary     Date:  06/03/19 Room / Location:  [facility] ENDOSCOPY 1 /  [facility]    Anesthesia Start:  0833 Anesthesia Stop:      Procedure:  ESOPHAGOGASTRODUODENOSCOPY (N/A ) Diagnosis:       NSAID long-term use      (NSAID long-term use [Z79.1])    Surgeon:  Brunner, Mark I, MD Provider:  Marcelino Tsang MD    Anesthesia Type:  general ASA Status:  3          Anesthesia Type: general  Last vitals  /63  123/66 (06/03/19 0815)   Temp 97  97.8 °F (36.6 °C) (06/03/19 0815)   Pulse 67  64 (06/03/19 0815)   Resp 14  16 (06/03/19 0815)     SpO2 98  99 % (06/03/19 0815)     Post Anesthesia Care and Evaluation    Patient location during evaluation: PACU  Patient participation: complete - patient participated  Level of consciousness: awake and alert  Pain score: 0  Pain management: adequate  Airway patency: patent  Anesthetic complications: No anesthetic complications  PONV Status: none  Cardiovascular status: hemodynamically stable and acceptable  Respiratory status: nonlabored ventilation, acceptable and nasal cannula  Hydration status: acceptable

## 2019-06-03 NOTE — PROGRESS NOTES
[facility] Medicine Services  PROGRESS NOTE    Patient Name: Salbador Apple  : 1940  MRN: [id number]    Date of Admission: 2019  Length of Stay: 2  Primary Care Physician: Kumar Rodriguez MD    Subjective   Subjective     CC:f/u GI bleed    HPI: Pt was seen after EGD this am, wife at bedside. Getting blood this am.     Review of Systems  Gen- No fevers, chills  CV- No chest pain, palpitations  Resp- No cough, dyspnea  GI- No N/V/D, abd pain    Otherwise ROS is negative except as mentioned in the HPI.    Objective   Objective     Vital Signs:   Temp:  [96.4 °F (35.8 °C)-98.5 °F (36.9 °C)] 98.1 °F (36.7 °C)  Heart Rate:  [59-73] 59  Resp:  [16-20] 20  BP: (104-138)/(56-73) 105/71  Total (NIH Stroke Scale): 0     Physical Exam:  Constitutional: Chronically ill-appearing male in no acute distress , awake, alert  HENT: NCAT, mucous membranes moist  Respiratory: Clear to auscultation bilaterally, respiratory effort normal   Cardiovascular: RRR, no murmurs, rubs, or gallops, palpable pedal pulses bilaterally  Gastrointestinal: Positive bowel sounds, soft, nontender, nondistended  Musculoskeletal: No bilateral ankle edema  Psychiatric: Appropriate affect, cooperative  Neurologic: Oriented x 3, generalized weakness, slurred speech  Skin: No rashes    Results Reviewed:  I have personally reviewed current lab, radiology, and data and agree.    Results from last 7 days   Lab Units 19  1313 19  0429 19  0046  19  1132   WBC 10*3/mm3  --   --   --   --  9.89   HEMOGLOBIN g/dL 8.0* 6.9* 7.4*   < > 9.8*   HEMATOCRIT % 25.2* 21.5* 22.7*   < > 30.2*   PLATELETS 10*3/mm3  --   --   --   --  236    < > = values in this interval not displayed.     Results from last 7 days   Lab Units 19  0459 19  1132   SODIUM mmol/L 140 141   POTASSIUM mmol/L 4.3 4.3   CHLORIDE mmol/L 112* 108*   CO2 mmol/L 21.0* 20.0*   BUN mg/dL 54* 51*   CREATININE mg/dL 0.88  1.09   GLUCOSE mg/dL 110* 136*   CALCIUM mg/dL 9.1 9.8   ALT (SGPT) U/L  --  12   AST (SGOT) U/L  --  13   TROPONIN T ng/mL  --  <0.010     Estimated Creatinine Clearance: 84.3 mL/min (by C-G formula based on SCr of 0.88 mg/dL).    Microbiology Results Abnormal     None          Imaging Results (last 24 hours)     Procedure Component Value Units Date/Time    MRI Brain Without Contrast [[id number]] Collected:  06/01/19 1929     Updated:  06/03/19 0744    Narrative:       EXAMINATION: MRI BRAIN WO CONTRAST - 06/01/2019        INDICATION: K92.1-Melena; R55-Syncope and collapse; Z79.1-Long term  (current) use of non-steroidal anti-inflammatories (nsaid)     TECHNIQUE: Sagittal and axial T1 and axial T2 FLAIR and  diffusion-weighted images of the brain, axial T2 flash images.     COMPARISON: Unenhanced CT scan of same date     FINDINGS: History indicates garbled speech, lightheadedness, lower  extremity numbness.     No restricted diffusion is seen to suggest acute infarction. Remaining  imaging sequences show expected degree of generalized cerebral atrophy  for age. 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 There is no evidence of  edema or hemorrhage, hydrocephalus or abnormal extra-axial collection.  There is a small old central right thalamic infarct and very small old  central right cerebellar infarct.       Impression:       Chronic-appearing central white matter changes and small old  central right thalamic lacunar infarct. Small old right cerebellar  infarct. No evidence of acute intracranial disease.      DICTATED:   06/02/2019  EDITED/ls :   06/02/2019      This report was finalized on 6/3/2019 7:41 AM by DR. Frank Selby MD.             Results for orders placed during the hospital encounter of 06/01/19   Adult Transthoracic Echo Complete W/ Cont if Necessary Per Protocol (With Agitated Saline)    Narrative · Chamber sizes and wall thicknesses are  normal.  · Global and segmental LV systolic function is normal. The calculated left   ventricular ejection fraction is 52%. Image quality is poor due to poor   acoustic windows.  · There is probable mild aortic valve sclerosis.  · There is mild mitral regurgitation.  · The estimated pulmonary artery pressure is normal.          I have reviewed the medications:    Current Facility-Administered Medications:   •  amLODIPine (NORVASC) tablet 5 mg, 5 mg, Oral, QABELEN, Esthela Denis MD, 5 mg at 06/03/19 0537  •  atorvastatin (LIPITOR) tablet 40 mg, 40 mg, Oral, Nightly, Esthela Denis MD, 40 mg at 06/02/19 2030  •  HYDROcodone-acetaminophen (NORCO) 7.5-325 MG per tablet 1 tablet, 1 tablet, Oral, Q6H PRN, Esthela Denis MD, 1 tablet at 06/03/19 1049  •  lactated ringers infusion, 9 mL/hr, Intravenous, Continuous, Marcelino Tsang MD, Last Rate: 9 mL/hr at 06/03/19 0735, 9 mL/hr at 06/03/19 0735  •  metoprolol succinate XL (TOPROL-XL) 24 hr tablet 50 mg, 50 mg, Oral, Cira QUIROGA Phonekeo, MD, 50 mg at 06/03/19 0537  •  ondansetron (ZOFRAN) injection 4 mg, 4 mg, Intravenous, Q6H PRN, Mitchell Kolb MD, 4 mg at 06/02/19 1332  •  pantoprazole (PROTONIX) injection 40 mg, 40 mg, Intravenous, BID AC, Brunner, Mark I, MD, 40 mg at 06/03/19 0941  •  sacubitril-valsartan (ENTRESTO) 24-26 MG tablet 1 tablet, 1 tablet, Oral, Cira QUIROGA Phonekeo, MD, 1 tablet at 06/03/19 0537  •  sodium chloride 0.9 % flush 10 mL, 10 mL, Intravenous, PRN, Jai Cook MD  •  sodium chloride 0.9 % flush 3 mL, 3 mL, Intravenous, Q12H, Esthela Denis MD, 3 mL at 06/02/19 2031  •  sodium chloride 0.9 % flush 3-10 mL, 3-10 mL, Intravenous, PRN, Esthela Denis MD  •  spironolactone (ALDACTONE) tablet 25 mg, 25 mg, Oral, QAMCira Phonekeo, MD, 25 mg at 06/03/19 0536      Assessment/Plan   Assessment / Plan     Active Hospital Problems    Diagnosis POA   • **Gastrointestinal hemorrhage  "with melena [K92.1] Yes   • Lactic acidosis [E87.2] Unknown   • Fall [W19.XXXA] Unknown   • Weakness [R53.1] Unknown   • CVA (cerebrovascular accident) (CMS/HCC) [I63.9] Yes   • Dysarthria [R47.1] Unknown   • Diverticular disease [K57.90] Unknown   • NSAID long-term use [Z79.1] Unknown     Added automatically from request for surgery [id number]     • Essential hypertension [I10] Yes   • Cardiomyopathy, ischemic [I25.5] Yes     Chronic cardiomyopathy diagnosed 2013 Dr. Espinosa   Remote ECHO low EF  [facility] 9/16 Dr. Guzman EF 30%  100% RCA- MV CAD with medical therapy per Dr. Guzman  Patient declines life vest/ icd         • Chronic systolic congestive heart failure (CMS/HCC) [I50.22] Yes     -     • Bilateral hearing loss [H91.93] Yes   • LUCIA (obstructive sleep apnea) [G47.33] Yes        Brief Hospital Course to date:  Salbador Apple is a 78 y.o. male with past medical history significant for essential hypertension, ischemic cardiomyopathy/systolic congestive heart failure, remote CVA, CAD, bilateral hearing loss, obstructive sleep apnea, diverticular disease and osteoarthritis/DJD-had been taking \"bottles and bottles\" of NSAIDs for years up until recently when he was prescribed Norco.  Currently patient reported that he still takes 2 full strength aspirin nightly.  Today he presented to the ED complaining of dark tarry stool with associated generalized weakness with and eventual fall last night.     Symptomatic acute anemia/Melena  -Suspect upper GI bleed given history of chronic heavy NSAIDs use over the years  -Screening colonoscopy was about 8 to 10 years ago, reported as unremarkable per patient's wife.  -s/p 1 unit of PRBC for HgB < 7 this am  -Monitor with serial H&H  -s/p EGD this am     Lactic acidosis, improving  -Likely from volume depletion/GI bleed  -CT abdomen/pelvis was relatively unremarkable, only diverticular disease noted.       Dysarthria/Prior stroke  -Stroke protocol initiated,MRI shows old " infarcts, nothing acute, awaiting neurology eval  -Antiplatelet therapy held due to GI bleed     CAD/Ischemic cardiomyopathy/systolic congestive heart failure/Essential hypertension  -Continue with current medications, patient follows with Dr. Guzman  -Most recent echo in 2017, EF was 40% at that time.  Repeat echo pending.     Obstructive sleep apnea  -CPAP at night     Generalized weakness/fall  -PT/OT to evaluate and treat as indicated  - consulted for DC planning     DVT prophylaxis: SCD     Disposition: TBD    CODE STATUS:   Code Status and Medical Interventions:   Ordered at: 06/01/19 1332     Level Of Support Discussed With:    Patient     Code Status:    CPR     Medical Interventions (Level of Support Prior to Arrest):    Full       Electronically signed by Tami Patterson MD, 06/03/19, 2:11 PM.

## 2019-06-03 NOTE — PLAN OF CARE
Problem: Patient Care Overview  Goal: Plan of Care Review  Outcome: Ongoing (interventions implemented as appropriate)   06/03/19 6538   Coping/Psychosocial   Plan of Care Reviewed With patient   OTHER   Outcome Summary Pt participated in gt training and LE strengthening exercises with PT. Pt ambulated 200ft with RW, forward flexed posture, and [id number]. PT reported dizziness initially when amb, but was able to continue walking after standing rest break. Recommend continued inpatient PT and D/C home with family assist PRN and outpatient PT.

## 2019-06-04 VITALS
TEMPERATURE: 98.2 F | SYSTOLIC BLOOD PRESSURE: 121 MMHG | RESPIRATION RATE: 16 BRPM | HEART RATE: 62 BPM | WEIGHT: 214.4 LBS | DIASTOLIC BLOOD PRESSURE: 52 MMHG | OXYGEN SATURATION: 94 % | BODY MASS INDEX: 29.04 KG/M2 | HEIGHT: 72 IN

## 2019-06-04 PROBLEM — R47.1 DYSARTHRIA: Status: RESOLVED | Noted: 2019-06-01 | Resolved: 2019-06-04
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
## 2019-06-04 PROBLEM — R53.1 WEAKNESS: Status: RESOLVED | Noted: 2019-06-01 | Resolved: 2019-06-04

## 2019-06-04 PROBLEM — E87.20 LACTIC ACIDOSIS: Status: RESOLVED | Noted: 2019-06-01 | Resolved: 2019-06-04

## 2019-06-04 LAB
ABO + RH BLD: NORMAL
ANION GAP SERPL CALCULATED.3IONS-SCNC: 8 MMOL/L
BH BB BLOOD EXPIRATION DATE: NORMAL
BH BB BLOOD TYPE BARCODE: 6200
BH BB DISPENSE STATUS: NORMAL
BH BB PRODUCT CODE: NORMAL
BH BB UNIT NUMBER: NORMAL
BUN BLD-MCNC: 32 MG/DL (ref 8–23)
BUN/CREAT SERPL: 36 (ref 7–25)
CALCIUM SPEC-SCNC: 9.5 MG/DL (ref 8.6–10.5)
CHLORIDE SERPL-SCNC: 110 MMOL/L (ref 98–107)
CO2 SERPL-SCNC: 24 MMOL/L (ref 22–29)
CREAT BLD-MCNC: 0.89 MG/DL (ref 0.76–1.27)
CYTO UR: NORMAL
DEPRECATED RDW RBC AUTO: 55.6 FL (ref 37–54)
ERYTHROCYTE [DISTWIDTH] IN BLOOD BY AUTOMATED COUNT: 16.1 % (ref 12.3–15.4)
GFR SERPL CREATININE-BSD FRML MDRD: 83 ML/MIN/1.73
GLUCOSE BLD-MCNC: 117 MG/DL (ref 65–99)
HCT VFR BLD AUTO: 23.2 % (ref 37.5–51)
HCT VFR BLD AUTO: 24.2 % (ref 37.5–51)
HCT VFR BLD AUTO: 25 % (ref 37.5–51)
HGB BLD-MCNC: 7.6 G/DL (ref 13–17.7)
HGB BLD-MCNC: 7.8 G/DL (ref 13–17.7)
HGB BLD-MCNC: 8.1 G/DL (ref 13–17.7)
LAB AP CASE REPORT: NORMAL
LAB AP CLINICAL INFORMATION: NORMAL
MCH RBC QN AUTO: 31 PG (ref 26.6–33)
MCHC RBC AUTO-ENTMCNC: 32.2 G/DL (ref 31.5–35.7)
MCV RBC AUTO: 96 FL (ref 79–97)
PATH REPORT.FINAL DX SPEC: NORMAL
PATH REPORT.GROSS SPEC: NORMAL
PLATELET # BLD AUTO: 182 10*3/MM3 (ref 140–450)
PMV BLD AUTO: 10.3 FL (ref 6–12)
POTASSIUM BLD-SCNC: 4.6 MMOL/L (ref 3.5–5.2)
RBC # BLD AUTO: 2.52 10*6/MM3 (ref 4.14–5.8)
SODIUM BLD-SCNC: 142 MMOL/L (ref 136–145)
UNIT  ABO: NORMAL
UNIT  RH: NORMAL
WBC NRBC COR # BLD: 7.44 10*3/MM3 (ref 3.4–10.8)

## 2019-06-04 PROCEDURE — 85014 HEMATOCRIT: CPT | Performed by: HOSPITALIST

## 2019-06-04 PROCEDURE — 85018 HEMOGLOBIN: CPT | Performed by: HOSPITALIST

## 2019-06-04 PROCEDURE — 85027 COMPLETE CBC AUTOMATED: CPT | Performed by: INTERNAL MEDICINE

## 2019-06-04 PROCEDURE — 99232 SBSQ HOSP IP/OBS MODERATE 35: CPT | Performed by: INTERNAL MEDICINE

## 2019-06-04 PROCEDURE — 99239 HOSP IP/OBS DSCHRG MGMT >30: CPT | Performed by: INTERNAL MEDICINE

## 2019-06-04 PROCEDURE — 80048 BASIC METABOLIC PNL TOTAL CA: CPT | Performed by: INTERNAL MEDICINE

## 2019-06-04 RX ORDER — PANTOPRAZOLE SODIUM 40 MG/1
40 TABLET, DELAYED RELEASE ORAL 2 TIMES DAILY
Qty: 60 TABLET | Refills: 0 | Status: SHIPPED | OUTPATIENT
Start: 2019-06-04

## 2019-06-04 RX ORDER — ATORVASTATIN CALCIUM 40 MG/1
40 TABLET, FILM COATED ORAL NIGHTLY
Qty: 30 TABLET | Refills: 0 | Status: SHIPPED | OUTPATIENT
Start: 2019-06-04

## 2019-06-04 RX ADMIN — PANTOPRAZOLE SODIUM 40 MG: 40 INJECTION, POWDER, FOR SOLUTION INTRAVENOUS at 06:15

## 2019-06-04 RX ADMIN — SPIRONOLACTONE 25 MG: 25 TABLET ORAL at 08:17

## 2019-06-04 RX ADMIN — PANTOPRAZOLE SODIUM 40 MG: 40 INJECTION, POWDER, FOR SOLUTION INTRAVENOUS at 08:17

## 2019-06-04 RX ADMIN — HYDROCODONE BITARTRATE AND ACETAMINOPHEN 1 TABLET: 7.5; 325 TABLET ORAL at 06:18

## 2019-06-04 RX ADMIN — SACUBITRIL AND VALSARTAN 1 TABLET: 24; 26 TABLET, FILM COATED ORAL at 06:15

## 2019-06-04 RX ADMIN — AMLODIPINE BESYLATE 5 MG: 5 TABLET ORAL at 06:15

## 2019-06-04 RX ADMIN — SODIUM CHLORIDE, PRESERVATIVE FREE 3 ML: 5 INJECTION INTRAVENOUS at 08:17

## 2019-06-04 RX ADMIN — METOPROLOL SUCCINATE 50 MG: 50 TABLET, EXTENDED RELEASE ORAL at 06:15

## 2019-06-04 NOTE — PLAN OF CARE
Problem: Fall Risk (Adult)  Goal: Absence of Fall  Outcome: Ongoing (interventions implemented as appropriate)   06/04/19 0637   Fall Risk (Adult)   Absence of Fall making progress toward outcome       Problem: Pain, Chronic (Adult)  Goal: Acceptable Pain/Comfort Level and Functional Ability  Outcome: Ongoing (interventions implemented as appropriate)   06/04/19 0637   Pain, Chronic (Adult)   Acceptable Pain/Comfort Level and Functional Ability making progress toward outcome

## 2019-06-04 NOTE — DISCHARGE SUMMARY
"    [facility] Medicine Services  DISCHARGE SUMMARY    Patient Name: Salbador Apple  : 1940  MRN: [id number]    Date of Admission: 2019  Date of Discharge:  2019  Primary Care Physician: Kumar Rodriguez MD    Consults     Date and Time Order Name Status Description    2019 1647 Inpatient Neurology Consult Stroke Completed     2019 1430 Inpatient Gastroenterology Consult Completed           Hospital Course     Presenting Problem:   Gastrointestinal hemorrhage with melena [K92.1]    Active Hospital Problems    Diagnosis  POA   • **Gastrointestinal hemorrhage with melena [K92.1]  Yes     Priority: High   • NSAID long-term use [Z79.1]  Unknown     Priority: High   • Essential hypertension [I10]  Yes     Priority: Medium   • Cardiomyopathy, ischemic [I25.5]  Yes     Priority: Medium   • Chronic systolic congestive heart failure (CMS/HCC) [I50.22]  Yes     Priority: Medium   • CVA (cerebrovascular accident) (CMS/HCC) [I63.9]  Yes     Priority: Low   • Diverticular disease [K57.90]  Unknown     Priority: Low   • Bilateral hearing loss [H91.93]  Yes     Priority: Low   • LUCIA (obstructive sleep apnea) [G47.33]  Yes     Priority: Low      Resolved Hospital Problems    Diagnosis Date Resolved POA   • Lactic acidosis [E87.2] 2019 Unknown     Priority: High   • Fall [W19.XXXA] 2019 Unknown     Priority: High   • Weakness [R53.1] 2019 Unknown     Priority: High   • Dysarthria [R47.1] 2019 Unknown     Priority: High          Hospital Course:  Salbador Apple is a 78 y.o. male with past medical history significant for essential hypertension, ischemic cardiomyopathy/systolic congestive heart failure, remote CVA, CAD, bilateral hearing loss, obstructive sleep apnea, diverticular disease and osteoarthritis/DJD-had been taking \"bottles and bottles\" of NSAIDs for years up until recently when he was prescribed Norco.  Currently patient reported that " "he still takes 2 full strength aspirin nightly.  He presented to the ED on 6/1 complaining of dark tarry stool with associated generalized weakness with an eventual fall on the evening prior to admission.  He was admitted to our service for further evaluation.      GI was consulted for his complaints of melena. He was transfused one unit PRBCs for symptomatic anemia initially on day two of admission and another subsequent unit of PRBCs on 6/3 for HgB<7.  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  He will need to continue BID PPI and discontinue all use of NSAIDs upon discharge. H pylori biopsy is PENDING at this time.  His H&H has been stable for the last 24 hours.     Neurology was also consulted given pt's complaints of weakness and falls as well as dysarthria concerning for CVA.  MRI failed to reveal a CVA and carotid duplex and TTE were essentially normal. He was started on a statin per stroke protocol- his LDL was within goal at 70, however, he does have a h/o prior CVA, so this will be continued upon d/c.  He was unable to take antiplatelets due to above-mentioned GIB.        Of note, pt was unable to tolerate his Entresto at BID dosing due to hypotension. For this reason, upon discharge, I will continue this at once daily dosing until he follows up with his PCP next week at which time he may readdress BID dosing.          Discharge Follow Up Recommendations for labs/diagnostics:   with PCP in one week.     Day of Discharge     HPI:   Doing well this am, denies any issues overnight. Anxious to return home today.     Review of Systems  Gen- No fevers, chills  CV- No chest pain, palpitations  Resp- No cough, dyspnea  GI- No N/V/D, abd pain    Otherwise ROS is negative except as mentioned in the HPI.    Vital Signs:   Temp:  [97.8 °F (36.6 °C)-98.4 °F (36.9 °C)] 98.2 °F (36.8 °C)  Heart " Rate:  [59-92] 62  Resp:  [16-20] 16  BP: (105-144)/(52-81) 121/52     Physical Exam:  Constitutional: Chronically ill-appearing male in no acute distress , awake, alert  HENT: NCAT, mucous membranes moist  Respiratory: Clear to auscultation bilaterally, respiratory effort normal   Cardiovascular: RRR, no murmurs, rubs, or gallops, palpable pedal pulses bilaterally  Gastrointestinal: Positive bowel sounds, soft, nontender, nondistended  Musculoskeletal: No bilateral ankle edema  Psychiatric: Appropriate affect, cooperative  Neurologic: Oriented x 3, generalized weakness, slurred speech  Skin: No rashes          Pertinent  and/or Most Recent Results     Results from last 7 days   Lab Units 06/04/19  0528 06/04/19  0012 06/03/19  1743 06/03/19  1313 06/03/19  0429 06/03/19  0046 06/02/19  1800 06/02/19  0459  06/01/19  1132   WBC 10*3/mm3 7.44  --   --   --   --   --   --   --   --  9.89   HEMOGLOBIN g/dL 7.8* 7.6* 7.8* 8.0* 6.9* 7.4* 7.6* 7.6*   < > 9.8*   HEMATOCRIT % 24.2* 23.2* 23.8* 25.2* 21.5* 22.7* 23.7* 23.6*   < > 30.2*   PLATELETS 10*3/mm3 182  --   --   --   --   --   --   --   --  236   SODIUM mmol/L 142  --   --   --   --   --   --  140  --  141   POTASSIUM mmol/L 4.6  --   --   --   --   --   --  4.3  --  4.3   CHLORIDE mmol/L 110*  --   --   --   --   --   --  112*  --  108*   CO2 mmol/L 24.0  --   --   --   --   --   --  21.0*  --  20.0*   BUN mg/dL 32*  --   --   --   --   --   --  54*  --  51*   CREATININE mg/dL 0.89  --   --   --   --   --   --  0.88  --  1.09   GLUCOSE mg/dL 117*  --   --   --   --   --   --  110*  --  136*   CALCIUM mg/dL 9.5  --   --   --   --   --   --  9.1  --  9.8    < > = values in this interval not displayed.     Results from last 7 days   Lab Units 06/01/19  1132   BILIRUBIN mg/dL 0.5   ALK PHOS U/L 52   ALT (SGPT) U/L 12   AST (SGOT) U/L 13     Results from last 7 days   Lab Units 06/02/19  0459   CHOLESTEROL mg/dL 136   TRIGLYCERIDES mg/dL 171*   HDL CHOL mg/dL 32*      Results from last 7 days   Lab Units 06/02/19  0459 06/01/19  1749 06/01/19  1132   HEMOGLOBIN A1C % 5.50  --   --    TROPONIN T ng/mL  --   --  <0.010   LACTATE mmol/L  --  1.8 2.5*       Brief Urine Lab Results  (Last result in the past 365 days)      Color   Clarity   Blood   Leuk Est   Nitrite   Protein   CREAT   Urine HCG        06/01/19 1340 Yellow Clear Negative Trace Negative Negative               Microbiology Results Abnormal     None          Imaging Results (all)     Procedure Component Value Units Date/Time    MRI Brain Without Contrast [[id number]] Collected:  06/01/19 1929     Updated:  06/03/19 0744    Narrative:       EXAMINATION: MRI BRAIN WO CONTRAST - 06/01/2019        INDICATION: K92.1-Melena; R55-Syncope and collapse; Z79.1-Long term  (current) use of non-steroidal anti-inflammatories (nsaid)     TECHNIQUE: Sagittal and axial T1 and axial T2 FLAIR and  diffusion-weighted images of the brain, axial T2 flash images.     COMPARISON: Unenhanced CT scan of same date     FINDINGS: History indicates garbled speech, lightheadedness, lower  extremity numbness.     No restricted diffusion is seen to suggest acute infarction. Remaining  imaging sequences show expected degree of generalized cerebral atrophy  for age. 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 There is no evidence of  edema or hemorrhage, hydrocephalus or abnormal extra-axial collection.  There is a small old central right thalamic infarct and very small old  central right cerebellar infarct.       Impression:       Chronic-appearing central white matter changes and small old  central right thalamic lacunar infarct. Small old right cerebellar  infarct. No evidence of acute intracranial disease.      DICTATED:   06/02/2019  EDITED/ls :   06/02/2019      This report was finalized on 6/3/2019 7:41 AM by DR. Frank Selby MD.       CT Abdomen Pelvis Without Contrast [[id number]]  Collected:  06/01/19 1240     Updated:  06/01/19 2249    Narrative:       EXAMINATION: CT ABDOMEN/PELVIS WO CONTRAST - 06/01/2019      INDICATION: Left lower quadrant pain.      TECHNIQUE: 5 mm unenhanced images through the abdomen and pelvis.     The radiation dose reduction device was turned on for each scan per the  ALARA (As Low as Reasonably Achievable) protocol.     COMPARISON: NONE     FINDINGS: History indicates blood in stool, weakness. No abdominal pain.     The included lower lungs appear grossly clear. No significant  abnormalities are seen of the liver, gallbladder, spleen, pancreas,  right adrenal gland or kidneys except for a few very faintly visible 1  mm nonobstructing calculi bilaterally. There is no evidence of  obstructive uropathy or ureteral calculus.. There is hyperplastic  appearance of the left adrenal gland. No upper abdominal free air,  ascites, adenopathy or acute inflammatory focus is identified.     Regarding the lower abdomen and pelvis, no mass, adenopathy, ascites or  acute inflammatory change is appreciated. Bladder is normally distended  and normal in appearance. There are scattered left colon diverticula.   No definite inflammatory focus is identified to indicate a  diverticulitis.       Impression:       1. Moderate left colon diverticulosis but no CT scan evidence to suggest  acute diverticulitis.  2. Very small nonobstructing bilateral renal calculi noted.  3. No evidence of acute intra-abdominal or intrapelvic disease is seen  elsewhere.     DICTATED:   06/01/2019  EDITED/ls :   06/01/2019         This report was finalized on 6/1/2019 10:46 PM by DR. Frank Selby MD.       CT Head Without Contrast [[id number]] Collected:  06/01/19 1314     Updated:  06/01/19 2202    Narrative:       EXAMINATION: CT HEAD WO CONTRAST - 06/01/2019     INDICATION: Head injury.      TECHNIQUE: 5 mm unenhanced images through the brain.     The radiation dose reduction device was turned on for each scan  per the  ALARA (As Low as Reasonably Achievable) protocol.     COMPARISON: Brain MRI 04/30/2012.     FINDINGS: The calvarium appears intact. Paranasal sinuses and mastoids  are grossly clear. Soft tissue window images show no evidence of  hemorrhage, contusion, edema, mass or mass effect, acute infarct,  hydrocephalus or abnormal extra-axial collection. There is a small old  infarct of the right central thalamus which has occurred since 2012.       Impression:       Small old right thalamic infarct. No evidence of acute  trauma or other acute disease.     DICTATED:   06/01/2019  EDITED/ls :   06/01/2019         This report was finalized on 6/1/2019 9:59 PM by DR. Frank Selby MD.       XR Chest 1 View [[id number]] Collected:  06/01/19 1154     Updated:  06/01/19 2135    Narrative:          EXAMINATION: XR CHEST 1 VW - 06/01/2019     INDICATION: Weakness, dizziness, altered mental status.      COMPARISON: NONE     FINDINGS: The heart is enlarged. Vasculature appears normal but the  lungs appear well expanded and clear.           Impression:       Mild cardiomegaly. No evidence of active chest disease.     DICTATED:   06/01/2019  EDITED/ls :   06/01/2019      This report was finalized on 6/1/2019 9:31 PM by DR. Frank Selby MD.             Results for orders placed during the hospital encounter of 06/01/19   Bilateral Carotid Duplex    Narrative · Right internal carotid artery stenosis of 0-49%.  · Left internal carotid artery stenosis of 0-49%.  · Mild heterogeneous nonflow limiting carotid atherosclerosis bilaterally          Results for orders placed during the hospital encounter of 06/01/19   Bilateral Carotid Duplex    Narrative · Right internal carotid artery stenosis of 0-49%.  · Left internal carotid artery stenosis of 0-49%.  · Mild heterogeneous nonflow limiting carotid atherosclerosis bilaterally          Results for orders placed during the hospital encounter of 06/01/19   Adult Transthoracic Echo Complete W/  Cont if Necessary Per Protocol (With Agitated Saline)    Narrative · Chamber sizes and wall thicknesses are normal.  · Global and segmental LV systolic function is normal. The calculated left   ventricular ejection fraction is 52%. Image quality is poor due to poor   acoustic windows.  · There is probable mild aortic valve sclerosis.  · There is mild mitral regurgitation.  · The estimated pulmonary artery pressure is normal.           Order Current Status    Tissue Pathology Exam In process        Discharge Details        Discharge Medications      New Medications      Instructions Start Date   atorvastatin 40 MG tablet  Commonly known as:  LIPITOR   40 mg, Oral, Nightly      pantoprazole 40 MG EC tablet  Commonly known as:  PROTONIX   40 mg, Oral, 2 Times Daily         Changes to Medications      Instructions Start Date   sacubitril-valsartan 24-26 MG tablet  Commonly known as:  ENTRESTO  What changed:    · when to take this  · additional instructions   1 tablet, Oral, Daily, Please reduce frequency to once daily until you follow up with your PCP next week         Continue These Medications      Instructions Start Date   amLODIPine 5 MG tablet  Commonly known as:  NORVASC   5 mg, Oral, Every Morning      HYDROcodone-acetaminophen 7.5-325 MG per tablet  Commonly known as:  NORCO   1 tablet, Oral, Every 6 Hours PRN      metoprolol succinate XL 50 MG 24 hr tablet  Commonly known as:  TOPROL-XL   50 mg, Oral, Every Morning      multivitamins-minerals capsule capsule   1 capsule, Oral, 2 Times Daily      spironolactone 25 MG tablet  Commonly known as:  ALDACTONE   25 mg, Oral, Every Morning         Stop These Medications    aspirin  MG tablet            No Known Allergies      Discharge Disposition:  Home or Self Care    Discharge Diet:  Diet Order   Procedures   • Diet Regular; Cardiac         Discharge Activity:         CODE STATUS:    Code Status and Medical Interventions:   Ordered at: 06/01/19 1332     Level  Of Support Discussed With:    Patient     Code Status:    CPR     Medical Interventions (Level of Support Prior to Arrest):    Full         No future appointments.    Additional Instructions for the Follow-ups that You Need to Schedule     Discharge Follow-up with PCP   As directed       Currently Documented PCP:    Kumar Rodriguez MD    PCP Phone Number:    [phone]     Follow Up Details:  in one week with PCP               Time Spent on Discharge:  35 minutes    Electronically signed by Tami Patterson MD, 06/04/19, 11:01 AM.

## 2019-06-04 NOTE — PROGRESS NOTES
"GI Daily Progress Note  Subjective:    Chief Complaint:  Melena and Epigastric Pain    Salbador Apple is a 78 year old male admitted with melena and epigastric pain.  Patient is POD 1 from EGD with Dr. 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  Patient denies any nausea, vomiting, diarrhea or pain associated with the procedure and notes that he is felling \"better\" than he was yesterday.  Inquires when he can go home seeing how he feels good. Patient able to pass flatulence and is voiding freely.     Objective:    /52 (BP Location: Right arm, Patient Position: Sitting)   Pulse 62   Temp 98.2 °F (36.8 °C) (Oral)   Resp 16   Ht 182.9 cm (72\")   Wt 97.3 kg (214 lb 6.4 oz)   SpO2 94%   BMI 29.08 kg/m²     Physical Exam   Constitutional: He is oriented to person, place, and time. He appears well-developed and well-nourished.   HENT:   Head: Normocephalic and atraumatic.   Mouth/Throat: Oropharynx is clear and moist. No oropharyngeal exudate.   Eyes: No scleral icterus.   Cardiovascular: Normal rate, regular rhythm, normal heart sounds and intact distal pulses.   No murmur heard.  Pulmonary/Chest: Effort normal and breath sounds normal. No stridor. No respiratory distress. He has no wheezes. He has no rales.   Genitourinary:   Genitourinary Comments: defer   Neurological: He is alert and oriented to person, place, and time.   Slurred speech and generalized weakness    Nursing note and vitals reviewed.      Lab  Lab Results   Component Value Date    WBC 7.44 06/04/2019    HGB 7.8 (L) 06/04/2019    HGB 7.6 (L) 06/04/2019    HGB 7.8 (L) 06/03/2019    MCV 96.0 06/04/2019     06/04/2019    INR 1.02 01/29/2017       Lab Results   Component Value Date    GLUCOSE 117 (H) 06/04/2019    BUN 32 (H) 06/04/2019    CREATININE 0.89 06/04/2019    EGFRIFNONA 83 06/04/2019    BCR 36.0 (H) 06/04/2019    CO2 24.0 06/04/2019    CALCIUM 9.5 06/04/2019    ALBUMIN 4.00 06/01/2019    ALKPHOS 52 " 06/01/2019    BILITOT 0.5 06/01/2019    ALT 12 06/01/2019    AST 13 06/01/2019       Assessment/ Plan:    Acute duodenal ulcer with hemorrhage.  Epigastric Pain, resolved.  Acute Blood Loss Anemia, improving.   Melena, resolved.   Chronic NSAID use    Patient POD1 from EGD with Dr. Brunner. States that he is feeling much better. Labs are trending upwards with latest H/H being 7.8/24.2   Lengthy discussion held with patient and wife on importance of avoiding NSAIDs and utilization of PPI therapy.  Still awaiting GI pathology report from biopsy obtained yesterday for H. Pylori; will contact patient if biopsy positive and will arrange for treatment.     >>>Continue Pantoprazole 40mg B.I.D for 4 weeks then daily.   >>>Encourage patient to avoid NSAIDs; may use OTC Acetaminophen as needed for pain.   >>>GI will sign off at this time.  Please feel free to contact us for any further needs or concerns.     JENN Ahumada Student  06/04/19  9:46 AM     I personally obtained history and performed examination of the patient.  I fully participated in management of the patient, and placed all orders.  I verify the history, physical exam, assessment, and plan documented by JENN Ahumada Student, and edited per myself.    Mark I. Brunner, MD  06/04/19  12:53 PM

## 2019-06-05 ENCOUNTER — READMISSION MANAGEMENT (OUTPATIENT)
Dept: CALL CENTER | Facility: HOSPITAL | Age: 79
End: 2019-06-05

## 2019-06-06 NOTE — OUTREACH NOTE
Prep Survey      Responses   Facility patient discharged from?  [facility]   Is patient eligible?  Yes   Discharge diagnosis  Gastrointestinal hemorrhage with melena    Does the patient have one of the following disease processes/diagnoses(primary or secondary)?  Other   Does the patient have Home health ordered?  No   Is there a DME ordered?  No   Prep survey completed?  Yes          Raya Kessler RN

## 2019-06-07 ENCOUNTER — READMISSION MANAGEMENT (OUTPATIENT)
Dept: CALL CENTER | Facility: HOSPITAL | Age: 79
End: 2019-06-07

## 2019-06-07 NOTE — OUTREACH NOTE
Medical Week 1 Survey      Responses   Facility patient discharged from?  [facility]   Does the patient have one of the following disease processes/diagnoses(primary or secondary)?  Other   Is there a successful TCM telephone encounter documented?  No   Week 1 attempt successful?  No   Unsuccessful attempts  Attempt 1          Rachael Flores RN

## 2019-06-10 ENCOUNTER — READMISSION MANAGEMENT (OUTPATIENT)
Dept: CALL CENTER | Facility: HOSPITAL | Age: 79
End: 2019-06-10

## 2019-06-18 ENCOUNTER — READMISSION MANAGEMENT (OUTPATIENT)
Dept: CALL CENTER | Facility: HOSPITAL | Age: 79
End: 2019-06-18

## 2019-06-18 NOTE — OUTREACH NOTE
Medical Week 2 Survey      Responses   Facility patient discharged from?  [facility]   Does the patient have one of the following disease processes/diagnoses(primary or secondary)?  Other   Week 2 attempt successful?  Yes   Call start time  1228   Discharge diagnosis  Gastrointestinal hemorrhage with melena    Call end time  1233   Person spoke with today (if not patient) and relationship  Constantine Apple (surrogate)   Meds reviewed with patient/caregiver?  No   Is the patient having any side effects they believe may be caused by any medication additions or changes?  Yes   Does the patient have all medications ordered at discharge?  Yes   Is the patient taking all medications as directed (includes completed medication regime)?  Yes   Does the patient have a primary care provider?   Yes   Does the patient have an appointment with their PCP within 7 days of discharge?  Yes   Has the patient kept scheduled appointments due by today?  Yes   Psychosocial issues?  No   Did the patient receive a copy of their discharge instructions?  Yes   Nursing interventions  Reviewed instructions with patient   What is the patient's perception of their health status since discharge?  Improving   Is the patient/caregiver able to teach back signs and symptoms related to disease process for when to call PCP?  Yes   Is the patient/caregiver able to teach back signs and symptoms related to disease process for when to call 911?  Yes   Is the patient/caregiver able to teach back the hierarchy of who to call/visit for symptoms/problems? PCP, Specialist, Home health nurse, Urgent Care, ED, 911  Yes   Week 2 Call Completed?  Yes   Wrap up additional comments  pt still dizzy at times, doing much better than before hospital.  has him taking iron. Watching his diet, increased protien iron rich foods.          Rachael Flores RN

## 2019-06-26 ENCOUNTER — READMISSION MANAGEMENT (OUTPATIENT)
Dept: CALL CENTER | Facility: HOSPITAL | Age: 79
End: 2019-06-26

## 2019-06-26 NOTE — OUTREACH NOTE
Medical Week 3 Survey      Responses   Facility patient discharged from?  [facility]   Does the patient have one of the following disease processes/diagnoses(primary or secondary)?  Other   Week 3 attempt successful?  No   Unsuccessful attempts  Attempt 1          Tamara Zhao RN

## 2019-06-28 ENCOUNTER — READMISSION MANAGEMENT (OUTPATIENT)
Dept: CALL CENTER | Facility: HOSPITAL | Age: 79
End: 2019-06-28

## 2019-06-28 NOTE — OUTREACH NOTE
Medical Week 3 Survey      Responses   Facility patient discharged from?  [facility]   Does the patient have one of the following disease processes/diagnoses(primary or secondary)?  Other   Week 3 attempt successful?  Yes   Call start time  0920   Call end time  0924   Discharge diagnosis  Gastrointestinal hemorrhage with melena    Is patient permission given to speak with other caregiver?  Yes   List who call center can speak with  Constantine   Person spoke with today (if not patient) and relationship  Constantine Apple (surrogate)   Meds reviewed with patient/caregiver?  Yes   Is the patient taking all medications as directed (includes completed medication regime)?  Yes   Medication comments  Iron pills causing dark stools   Has the patient kept scheduled appointments due by today?  Yes   Psychosocial issues?  No   Additional teach back comments  Reception very poor with caregiver as [place]    Week 3 Call Completed?  Yes          Twila Rodriguez RN

## 2019-07-08 ENCOUNTER — READMISSION MANAGEMENT (OUTPATIENT)
Dept: CALL CENTER | Facility: HOSPITAL | Age: 79
End: 2019-07-08

## 2019-07-08 NOTE — OUTREACH NOTE
Medical Week 4 Survey      Responses   Facility patient discharged from?  [facility]   Does the patient have one of the following disease processes/diagnoses(primary or secondary)?  Other   Week 4 attempt successful?  No          Dayanara Blake RN

## 2019-08-17 NOTE — OUTREACH NOTE
Medical Week 1 Survey      Responses   Facility patient discharged from?  [facility]   Does the patient have one of the following disease processes/diagnoses(primary or secondary)?  Other   Is there a successful TCM telephone encounter documented?  No   Week 1 attempt successful?  Yes   Call start time  1228   Call end time  1231   Discharge diagnosis  Gastrointestinal hemorrhage with melena    Person spoke with today (if not patient) and relationship  Constantine Apple (surrogate)   Meds reviewed with patient/caregiver?  No   Does the patient have a primary care provider?   Yes   Comments regarding PCP  Pt will see PCP at 120 today due to dizziness.     Comments  Pt's BP continues to be low.   What is the patient's perception of their health status since discharge?  Improving   Is the patient/caregiver able to teach back signs and symptoms related to disease process for when to call PCP?  Yes   Week 1 call completed?  Yes   Wrap up additional comments  The call was brief as family member was driving.            Claribel Diehl RN   Yes

## (undated) DEVICE — SINGLE-USE BIOPSY FORCEPS: Brand: RADIAL JAW 4

## (undated) DEVICE — JELLY,LUBE,STERILE,FLIP TOP,TUBE,2-OZ: Brand: MEDLINE

## (undated) DEVICE — CONTN GRAD MEAS TRIANG 32OZ BLK

## (undated) DEVICE — SPNG ENDO BEDSIDE TUB ENZYM

## (undated) DEVICE — HYBRID CO2 TUBING/CAP SET FOR OLYMPUS® SCOPES & CO2 SOURCE: Brand: ERBE

## (undated) DEVICE — THE BITE BLOCK MAXI, LATEX FREE STRAP IS USED TO PROTECT THE ENDOSCOPE INSERTION TUBE FROM BEING BITTEN BY THE PATIENT.

## (undated) DEVICE — SYR LUERLOK 50ML

## (undated) DEVICE — SOL IRR H2O BG 1000ML STRL

## (undated) DEVICE — KT BIOGUARD SXN VLV AIR/H20 4PC DISP